# Patient Record
Sex: FEMALE | Race: WHITE | NOT HISPANIC OR LATINO | Employment: OTHER | URBAN - METROPOLITAN AREA
[De-identification: names, ages, dates, MRNs, and addresses within clinical notes are randomized per-mention and may not be internally consistent; named-entity substitution may affect disease eponyms.]

---

## 2017-01-01 ENCOUNTER — APPOINTMENT (OUTPATIENT)
Dept: MEDICAL GROUP | Facility: MEDICAL CENTER | Age: 82
End: 2017-01-01
Payer: MEDICARE

## 2017-01-01 ENCOUNTER — OFFICE VISIT (OUTPATIENT)
Dept: MEDICAL GROUP | Facility: MEDICAL CENTER | Age: 82
End: 2017-01-01
Payer: MEDICARE

## 2017-01-01 ENCOUNTER — OFFICE VISIT (OUTPATIENT)
Dept: BEHAVIORAL HEALTH | Facility: PHYSICIAN GROUP | Age: 82
End: 2017-01-01
Payer: MEDICARE

## 2017-01-01 ENCOUNTER — TELEPHONE (OUTPATIENT)
Dept: BEHAVIORAL HEALTH | Facility: PHYSICIAN GROUP | Age: 82
End: 2017-01-01

## 2017-01-01 VITALS
HEIGHT: 68 IN | BODY MASS INDEX: 37.59 KG/M2 | WEIGHT: 248 LBS | HEART RATE: 96 BPM | SYSTOLIC BLOOD PRESSURE: 148 MMHG | DIASTOLIC BLOOD PRESSURE: 82 MMHG

## 2017-01-01 VITALS
HEART RATE: 71 BPM | SYSTOLIC BLOOD PRESSURE: 120 MMHG | DIASTOLIC BLOOD PRESSURE: 70 MMHG | WEIGHT: 233.47 LBS | OXYGEN SATURATION: 90 % | HEIGHT: 68 IN | RESPIRATION RATE: 14 BRPM | TEMPERATURE: 97 F | BODY MASS INDEX: 35.38 KG/M2

## 2017-01-01 VITALS — SYSTOLIC BLOOD PRESSURE: 137 MMHG | HEIGHT: 68 IN | DIASTOLIC BLOOD PRESSURE: 89 MMHG | HEART RATE: 81 BPM

## 2017-01-01 VITALS
HEIGHT: 68 IN | OXYGEN SATURATION: 90 % | BODY MASS INDEX: 34.56 KG/M2 | HEART RATE: 95 BPM | RESPIRATION RATE: 16 BRPM | SYSTOLIC BLOOD PRESSURE: 120 MMHG | TEMPERATURE: 97 F | DIASTOLIC BLOOD PRESSURE: 82 MMHG | WEIGHT: 228 LBS

## 2017-01-01 DIAGNOSIS — I10 ESSENTIAL HYPERTENSION: ICD-10-CM

## 2017-01-01 DIAGNOSIS — F41.9 ANXIETY: ICD-10-CM

## 2017-01-01 DIAGNOSIS — J44.9 CHRONIC OBSTRUCTIVE PULMONARY DISEASE, UNSPECIFIED COPD TYPE (HCC): ICD-10-CM

## 2017-01-01 DIAGNOSIS — Z23 NEEDS FLU SHOT: ICD-10-CM

## 2017-01-01 DIAGNOSIS — F13.20 XANAX USE DISORDER, MODERATE, DEPENDENCE (HCC): ICD-10-CM

## 2017-01-01 DIAGNOSIS — R10.9 ABDOMINAL DISCOMFORT: ICD-10-CM

## 2017-01-01 DIAGNOSIS — F41.9 ANXIETY DISORDER, UNSPECIFIED TYPE: ICD-10-CM

## 2017-01-01 DIAGNOSIS — Z95.0 PACEMAKER: ICD-10-CM

## 2017-01-01 DIAGNOSIS — Z00.00 HEALTHCARE MAINTENANCE: ICD-10-CM

## 2017-01-01 DIAGNOSIS — N39.3 STRESS INCONTINENCE OF URINE: ICD-10-CM

## 2017-01-01 DIAGNOSIS — Z95.0 CARDIAC PACEMAKER: ICD-10-CM

## 2017-01-01 DIAGNOSIS — I48.0 PAROXYSMAL ATRIAL FIBRILLATION (HCC): ICD-10-CM

## 2017-01-01 DIAGNOSIS — F41.9 CHRONIC ANXIETY: ICD-10-CM

## 2017-01-01 PROCEDURE — 99214 OFFICE O/P EST MOD 30 MIN: CPT | Performed by: FAMILY MEDICINE

## 2017-01-01 PROCEDURE — G0008 ADMIN INFLUENZA VIRUS VAC: HCPCS | Performed by: FAMILY MEDICINE

## 2017-01-01 PROCEDURE — 99213 OFFICE O/P EST LOW 20 MIN: CPT | Performed by: PSYCHIATRY & NEUROLOGY

## 2017-01-01 PROCEDURE — 90662 IIV NO PRSV INCREASED AG IM: CPT | Performed by: FAMILY MEDICINE

## 2017-01-01 PROCEDURE — 99214 OFFICE O/P EST MOD 30 MIN: CPT | Mod: 25 | Performed by: FAMILY MEDICINE

## 2017-01-01 PROCEDURE — 99214 OFFICE O/P EST MOD 30 MIN: CPT | Performed by: PSYCHIATRY & NEUROLOGY

## 2017-01-01 RX ORDER — ALPRAZOLAM 0.5 MG/1
0.5 TABLET ORAL 3 TIMES DAILY PRN
Qty: 90 TAB | Refills: 2 | Status: SHIPPED
Start: 2017-01-01 | End: 2018-01-01 | Stop reason: SDUPTHER

## 2017-01-01 RX ORDER — ALPRAZOLAM 0.5 MG/1
0.5 TABLET ORAL 3 TIMES DAILY PRN
Qty: 90 TAB | Refills: 2 | OUTPATIENT
Start: 2017-01-01

## 2017-01-01 RX ORDER — ALPRAZOLAM 0.5 MG/1
0.5 TABLET ORAL 3 TIMES DAILY PRN
Qty: 90 TAB | Refills: 2 | Status: SHIPPED
Start: 2017-01-01 | End: 2017-01-01 | Stop reason: SDUPTHER

## 2017-01-01 ASSESSMENT — PATIENT HEALTH QUESTIONNAIRE - PHQ9
CLINICAL INTERPRETATION OF PHQ2 SCORE: 6
5. POOR APPETITE OR OVEREATING: 0 - NOT AT ALL
SUM OF ALL RESPONSES TO PHQ QUESTIONS 1-9: 16

## 2017-01-03 DIAGNOSIS — F41.1 ANXIETY STATE: ICD-10-CM

## 2017-01-03 RX ORDER — ALPRAZOLAM 0.5 MG/1
0.5 TABLET ORAL 4 TIMES DAILY
Qty: 120 TAB | Refills: 0 | Status: SHIPPED
Start: 2017-01-03 | End: 2017-01-04 | Stop reason: SDUPTHER

## 2017-01-03 NOTE — TELEPHONE ENCOUNTER
Was the patient seen in the last year in this department? Yes     Does patient have an active prescription for medications requested? No     Received Request Via: Patient

## 2017-01-04 DIAGNOSIS — F41.1 ANXIETY STATE: ICD-10-CM

## 2017-01-04 RX ORDER — ALPRAZOLAM 0.5 MG/1
0.5 TABLET ORAL 4 TIMES DAILY
Qty: 120 TAB | Refills: 0 | Status: SHIPPED
Start: 2017-01-04 | End: 2017-02-01 | Stop reason: SDUPTHER

## 2017-02-01 ENCOUNTER — TELEPHONE (OUTPATIENT)
Dept: MEDICAL GROUP | Facility: MEDICAL CENTER | Age: 82
End: 2017-02-01

## 2017-02-01 DIAGNOSIS — F41.1 ANXIETY STATE: ICD-10-CM

## 2017-02-01 RX ORDER — ALPRAZOLAM 0.5 MG/1
0.5 TABLET ORAL 4 TIMES DAILY
Qty: 120 TAB | Refills: 0 | Status: CANCELLED
Start: 2017-02-01

## 2017-02-01 RX ORDER — ALPRAZOLAM 0.5 MG/1
0.5 TABLET ORAL 4 TIMES DAILY
Qty: 120 TAB | Refills: 0 | Status: SHIPPED
Start: 2017-02-01 | End: 2017-02-13 | Stop reason: SDUPTHER

## 2017-02-01 NOTE — TELEPHONE ENCOUNTER
Was the patient seen in the last year in this department? Yes     Does patient have an active prescription for medications requested? No    Received Request Via: Pharmacy

## 2017-02-13 ENCOUNTER — HOSPITAL ENCOUNTER (OUTPATIENT)
Dept: LAB | Facility: MEDICAL CENTER | Age: 82
End: 2017-02-13
Attending: INTERNAL MEDICINE
Payer: MEDICARE

## 2017-02-13 ENCOUNTER — OFFICE VISIT (OUTPATIENT)
Dept: MEDICAL GROUP | Facility: MEDICAL CENTER | Age: 82
End: 2017-02-13
Payer: MEDICARE

## 2017-02-13 VITALS
HEIGHT: 68 IN | RESPIRATION RATE: 16 BRPM | OXYGEN SATURATION: 95 % | BODY MASS INDEX: 35.46 KG/M2 | DIASTOLIC BLOOD PRESSURE: 80 MMHG | WEIGHT: 234 LBS | SYSTOLIC BLOOD PRESSURE: 148 MMHG | HEART RATE: 77 BPM | TEMPERATURE: 97 F

## 2017-02-13 DIAGNOSIS — E55.9 VITAMIN D DEFICIENCY: ICD-10-CM

## 2017-02-13 DIAGNOSIS — K59.01 SLOW TRANSIT CONSTIPATION: ICD-10-CM

## 2017-02-13 DIAGNOSIS — F41.1 ANXIETY STATE: ICD-10-CM

## 2017-02-13 DIAGNOSIS — F41.9 ANXIETY: ICD-10-CM

## 2017-02-13 DIAGNOSIS — E53.8 B12 DEFICIENCY: ICD-10-CM

## 2017-02-13 DIAGNOSIS — Z13.220 SCREENING, LIPID: ICD-10-CM

## 2017-02-13 DIAGNOSIS — F13.20 BENZODIAZEPINE DEPENDENCE (HCC): ICD-10-CM

## 2017-02-13 LAB
25(OH)D3 SERPL-MCNC: 32 NG/ML (ref 30–100)
ALBUMIN SERPL BCP-MCNC: 4.1 G/DL (ref 3.2–4.9)
ALBUMIN/GLOB SERPL: 1.1 G/DL
ALP SERPL-CCNC: 111 U/L (ref 30–99)
ALT SERPL-CCNC: 11 U/L (ref 2–50)
ANION GAP SERPL CALC-SCNC: 8 MMOL/L (ref 0–11.9)
ANION GAP SERPL CALC-SCNC: 8 MMOL/L (ref 0–11.9)
AST SERPL-CCNC: 14 U/L (ref 12–45)
BASOPHILS # BLD AUTO: 0.02 K/UL (ref 0–0.12)
BASOPHILS NFR BLD AUTO: 0.3 % (ref 0–1.8)
BILIRUB SERPL-MCNC: 0.5 MG/DL (ref 0.1–1.5)
BUN SERPL-MCNC: 15 MG/DL (ref 8–22)
BUN SERPL-MCNC: 15 MG/DL (ref 8–22)
CALCIUM SERPL-MCNC: 10.1 MG/DL (ref 8.5–10.5)
CALCIUM SERPL-MCNC: 10.1 MG/DL (ref 8.5–10.5)
CHLORIDE SERPL-SCNC: 98 MMOL/L (ref 96–112)
CHLORIDE SERPL-SCNC: 98 MMOL/L (ref 96–112)
CHOLEST SERPL-MCNC: 270 MG/DL (ref 100–199)
CO2 SERPL-SCNC: 31 MMOL/L (ref 20–33)
CO2 SERPL-SCNC: 31 MMOL/L (ref 20–33)
CREAT SERPL-MCNC: 0.73 MG/DL (ref 0.5–1.4)
CREAT SERPL-MCNC: 0.74 MG/DL (ref 0.5–1.4)
EOSINOPHIL # BLD: 0.1 K/UL (ref 0–0.51)
EOSINOPHIL NFR BLD AUTO: 1.6 % (ref 0–6.9)
ERYTHROCYTE [DISTWIDTH] IN BLOOD BY AUTOMATED COUNT: 47.9 FL (ref 35.9–50)
FOLATE SERPL-MCNC: 15.2 NG/ML
GLOBULIN SER CALC-MCNC: 3.9 G/DL (ref 1.9–3.5)
GLUCOSE SERPL-MCNC: 105 MG/DL (ref 65–99)
GLUCOSE SERPL-MCNC: 105 MG/DL (ref 65–99)
HCT VFR BLD AUTO: 41.8 % (ref 37–47)
HDLC SERPL-MCNC: 68 MG/DL
HGB BLD-MCNC: 12.9 G/DL (ref 12–16)
IMM GRANULOCYTES # BLD AUTO: 0.01 K/UL (ref 0–0.11)
IMM GRANULOCYTES NFR BLD AUTO: 0.2 % (ref 0–0.9)
LDLC SERPL CALC-MCNC: 173 MG/DL
LYMPHOCYTES # BLD: 1.3 K/UL (ref 1–4.8)
LYMPHOCYTES NFR BLD AUTO: 20.2 % (ref 22–41)
MCH RBC QN AUTO: 29.3 PG (ref 27–33)
MCHC RBC AUTO-ENTMCNC: 30.9 G/DL (ref 33.6–35)
MCV RBC AUTO: 95 FL (ref 81.4–97.8)
MONOCYTES # BLD: 0.45 K/UL (ref 0–0.85)
MONOCYTES NFR BLD AUTO: 7 % (ref 0–13.4)
NEUTROPHILS # BLD: 4.55 K/UL (ref 2–7.15)
NEUTROPHILS NFR BLD AUTO: 70.7 % (ref 44–72)
NRBC # BLD AUTO: 0 K/UL
NRBC BLD-RTO: 0 /100 WBC
PLATELET # BLD AUTO: 223 K/UL (ref 164–446)
PMV BLD AUTO: 10.7 FL (ref 9–12.9)
POTASSIUM SERPL-SCNC: 3.8 MMOL/L (ref 3.6–5.5)
POTASSIUM SERPL-SCNC: 3.9 MMOL/L (ref 3.6–5.5)
PROT SERPL-MCNC: 8 G/DL (ref 6–8.2)
RBC # BLD AUTO: 4.4 M/UL (ref 4.2–5.4)
SODIUM SERPL-SCNC: 137 MMOL/L (ref 135–145)
SODIUM SERPL-SCNC: 137 MMOL/L (ref 135–145)
TRIGL SERPL-MCNC: 144 MG/DL (ref 0–149)
VIT B12 SERPL-MCNC: 269 PG/ML (ref 211–911)
WBC # BLD AUTO: 6.4 K/UL (ref 4.8–10.8)

## 2017-02-13 PROCEDURE — 99214 OFFICE O/P EST MOD 30 MIN: CPT | Performed by: INTERNAL MEDICINE

## 2017-02-13 PROCEDURE — G8599 NO ASA/ANTIPLAT THER USE RNG: HCPCS | Performed by: INTERNAL MEDICINE

## 2017-02-13 PROCEDURE — 4040F PNEUMOC VAC/ADMIN/RCVD: CPT | Mod: 8P | Performed by: INTERNAL MEDICINE

## 2017-02-13 PROCEDURE — G8432 DEP SCR NOT DOC, RNG: HCPCS | Performed by: INTERNAL MEDICINE

## 2017-02-13 PROCEDURE — G8484 FLU IMMUNIZE NO ADMIN: HCPCS | Performed by: INTERNAL MEDICINE

## 2017-02-13 PROCEDURE — G8419 CALC BMI OUT NRM PARAM NOF/U: HCPCS | Performed by: INTERNAL MEDICINE

## 2017-02-13 PROCEDURE — 1036F TOBACCO NON-USER: CPT | Performed by: INTERNAL MEDICINE

## 2017-02-13 PROCEDURE — 82306 VITAMIN D 25 HYDROXY: CPT

## 2017-02-13 PROCEDURE — 1101F PT FALLS ASSESS-DOCD LE1/YR: CPT | Mod: 8P | Performed by: INTERNAL MEDICINE

## 2017-02-13 PROCEDURE — 82607 VITAMIN B-12: CPT

## 2017-02-13 PROCEDURE — 80053 COMPREHEN METABOLIC PANEL: CPT

## 2017-02-13 PROCEDURE — 82746 ASSAY OF FOLIC ACID SERUM: CPT

## 2017-02-13 PROCEDURE — 85025 COMPLETE CBC W/AUTO DIFF WBC: CPT

## 2017-02-13 RX ORDER — LUBIPROSTONE 8 UG/1
8 CAPSULE ORAL 2 TIMES DAILY
Qty: 60 CAP | Refills: 6
Start: 2017-02-13 | End: 2018-01-01

## 2017-02-13 RX ORDER — ALPRAZOLAM 0.5 MG/1
0.5 TABLET ORAL 4 TIMES DAILY
Qty: 120 TAB | Refills: 0 | Status: SHIPPED
Start: 2017-02-13 | End: 2017-03-22 | Stop reason: SDUPTHER

## 2017-02-13 NOTE — MR AVS SNAPSHOT
"        Emily Hopson   2017 3:00 PM   Office Visit   MRN: [id number]    Department:  [address] [place] [address]   Dept Phone:  [phone]    Description:  Female : 1935   Provider:  Lalit Arellano M.D.           Reason for Visit     Follow-Up     GI Problem           Allergies as of 2017     Allergen Noted Reactions    Aspirin 10/23/2014       Cannot take due to bleeding ulcer in the 70's.    Butorphanol Tartrate [Kdc:Benzalkonium Chloride+Butorphanol] 10/23/2014       Pt denies allergy.    [place] 10/30/2015       heartburn    Codeine 10/23/2014   Rash, Vomiting    Pt sates rash and vomiting.    Dilaudid [Hydromorphone Hcl] 2017       Heparin 10/23/2014       Per historical: thrombocytopenia.    Ketorolac Tromethamine 10/23/2014       Pt denies allergy.  Per historical: N/V.    Meperidine 10/23/2014   Rash, Vomiting    Okay with Promethazine.    Milk Products Food Allergy 10/30/2015       Sensitivity to it. Causes bloating.     Morphine 10/23/2014   Rash, Vomiting    Novocain 10/23/2014       With epinephrine, causes patient to go into A-Fib.    Other Drug 2011       States allergic to most local anesthetics with epi    Oxycodone 2017       Sulfa Drugs 10/23/2014       Pt unsure of reaction.    Talwin 10/23/2014       \"Makes me go bezerk.\"    Tape 2009   Rash    Pt states rash.    Warfarin Sodium 10/23/2014       Due to also taking Nexium, pt's INR was 21.      You were diagnosed with     Slow transit constipation   [564.01.ICD-9-CM]       Anxiety   [081410]       Screening, lipid   [187410]       B12 deficiency   [238189]       Vitamin D deficiency   [3988880]       Benzodiazepine dependence (CMS-HCC)   [887481]       Anxiety state   [943103]         Vital Signs     Blood Pressure Pulse Temperature Respirations Height Weight    148/80 mmHg 77 36.1 °C (97 °F) 16 1.727 m (5' 8\") 106.142 kg (234 lb)    Body Mass Index Oxygen Saturation Smoking Status             35.59 " kg/m2 95% Former Smoker         Basic Information     Date Of Birth Sex Race Ethnicity Preferred Language    1935 Female White Non- English      Your appointments     Mar 06, 2017  3:40 PM   Established Patient with Lalit Arellano M.D.   [facility] [address]    [address]  [address]  [place] [address]   [phone]           You will be receiving a confirmation call a few days before your appointment from our automated call confirmation system.              Problem List              ICD-10-CM Priority Class Noted - Resolved    Sleep apnea G47.30 Medium  12/25/2013 - Present    Anemia D64.9 Medium  12/31/2013 - Present    Paroxysmal atrial fibrillation (CMS-HCC) I48.0 Medium  12/31/2013 - Present    Anxiety F41.9 High  12/31/2013 - Present    Normocytic anemia D64.9 Medium  1/18/2014 - Present    Obesity E66.9 Low  2/16/2014 - Present    Chronic pain G89.29 Medium  2/16/2014 - Present    H/O vitamin D deficiency Z86.39 Low  2/16/2014 - Present    Chronic abdominal pain R10.9, G89.29 Medium  2/16/2014 - Present    CAD (coronary artery disease), native coronary artery I25.10 High  2/16/2014 - Present    History of colonic polyps Z86.010 Low  7/1/2014 - Present    Chest pain R07.9 Medium  10/20/2014 - Present    HTN (hypertension) I10 Medium  10/24/2014 - Present    Fatigue R53.83 Medium  3/10/2015 - Present    Nausea R11.0 Low  3/10/2015 - Present    Constipation K59.00 Medium  5/11/2015 - Present    Colitis K52.9 High  5/27/2015 - Present    Depressive disorder, not elsewhere classified F32.9 Medium  8/4/2015 - Present    Impaired fasting glucose R73.01 Medium  8/5/2015 - Present    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 High  9/29/2015 - Present    Leukopenia D72.819 Medium  11/3/2015 - Present    COPD (chronic obstructive pulmonary disease) (CMS-HCC) J44.9 High  11/3/2015 - Present    Low vitamin B12 level E53.8 Low  12/10/2015 - Present    Dermatophytosis, hand B35.2 Low   12/10/2015 - Present    Multiple lung nodules on CT R91.8   8/4/2016 - Present    Lung metastases (CMS-HCC) C78.00   8/4/2016 - Present      Health Maintenance        Date Due Completion Dates    IMM DTaP/Tdap/Td Vaccine (1 - Tdap) 5/10/1956 ---    IMM ZOSTER VACCINE 5/10/1997 ---    IMM PNEUMOCOCCAL 65+ (ADULT) LOW/MEDIUM RISK SERIES (1 of 2 - PCV13) 5/10/2002 ---    IMM INFLUENZA (1) 9/1/2016 10/20/2014, 10/1/2013    COLONOSCOPY 7/23/2024 7/23/2014, 7/10/2014 (Prv Comp)    Override on 7/10/2014: Previously completed            Current Immunizations     Influenza TIV (IM) 10/1/2013    Influenza Vaccine Adult HD 10/20/2014  8:48 AM    Pneumococcal Vaccine (UF)Historical Data 10/1/2013      Below and/or attached are the medications your provider expects you to take. Review all of your home medications and newly ordered medications with your provider and/or pharmacist. Follow medication instructions as directed by your provider and/or pharmacist. Please keep your medication list with you and share with your provider. Update the information when medications are discontinued, doses are changed, or new medications (including over-the-counter products) are added; and carry medication information at all times in the event of emergency situations     Allergies:  ASPIRIN - (reactions not documented)     BUTORPHANOL TARTRATE - (reactions not documented)     CITRUS - (reactions not documented)     CODEINE - Rash,Vomiting     DILAUDID - (reactions not documented)     HEPARIN - (reactions not documented)     KETOROLAC TROMETHAMINE - (reactions not documented)     MEPERIDINE - Rash,Vomiting     MILK PRODUCTS FOOD ALLERGY - (reactions not documented)     MORPHINE - Rash,Vomiting     NOVOCAIN - (reactions not documented)     OTHER DRUG - (reactions not documented)     OXYCODONE - (reactions not documented)     SULFA DRUGS - (reactions not documented)     TALWIN - (reactions not documented)     TAPE - Rash     WARFARIN SODIUM -  (reactions not documented)               Medications  Valid as of: February 13, 2017 -  4:25 PM    Generic Name Brand Name Tablet Size Instructions for use    ALPRAZolam (Tab) XANAX 0.5 MG Take 1 Tab by mouth 4 times a day.        CloNIDine HCl (Tab) CATAPRES 0.1 MG Take 0.1 mg by mouth 4 times a day as needed. Every 4 hours as needed only if systolic is above 170 or diastolic 99 either   Indications: High Blood Pressure        Clotrimazole-Betamethasone (Cream) LOTRISONE 1-0.05 % Apply 1 Application to affected area(s) 2 times a day.        Cyanocobalamin (Tab) vitamin b12 500 MCG Take 1 Tab by mouth every day.        DiltiaZEM HCl Coated Beads (CAPSULE SR 24 HR) CARDIZEM  MG Take 1 Cap by mouth every day. Takes 120 mg  mg PM fill both        DiltiaZEM HCl Coated Beads (CAPSULE SR 24 HR) CARDIZEM  MG Take 1 Cap by mouth every evening.        Esomeprazole Magnesium (CAPSULE DELAYED RELEASE) NEXIUM 40 MG Take 1 Cap by mouth every day.        Levalbuterol Tartrate (Aerosol) XOPENEX HFA 45 MCG/ACT Inhale 2 Puffs by mouth every four hours as needed for Shortness of Breath.        Lubiprostone (Cap) Lubiprostone 8 MCG Take 1 Cap by mouth 2 Times a Day.        Magnesium Hydroxide (Suspension) MILK OF MAGNESIA 400 MG/5ML Take 30 mL by mouth 1 time daily as needed (Constipation).        Nitroglycerin (SL Tab) NITROSTAT 0.4 MG Place 0.4 mg under tongue every 5 minutes as needed for Chest Pain.        Ondansetron (TABLET DISPERSIBLE) ZOFRAN ODT 4 MG Take 1 Tab by mouth 4 times a day as needed for Nausea/Vomiting (for N/V).        .                 Medicines prescribed today were sent to:     [id number] [place] - [address]    [address] [place] [address]    Phone: [phone] Fax: [phone]    Open 24 Hours?: No    [id number] - [place] - [address]    [address] [place] [address]    Phone: [phone] Fax: [phone]    Open 24 Hours?: No        Medication refill instructions:       If your prescription bottle indicates you have medication refills left, it is not necessary to call your provider’s office. Please contact your pharmacy and they will refill your medication.    If your prescription bottle indicates you do not have any refills left, you may request refills at any time through one of the following ways: The online [employer] system (except Urgent Care), by calling your provider’s office, or by asking your pharmacy to contact your provider’s office with a refill request. Medication refills are processed only during regular business hours and may not be available until the next business day. Your provider may request additional information or to have a follow-up visit with you prior to refilling your medication.   *Please Note: Medication refills are assigned a new Rx number when refilled electronically. Your pharmacy may indicate that no refills were authorized even though a new prescription for the same medication is available at the pharmacy. Please request the medicine by name with the pharmacy before contacting your provider for a refill.        Your To Do List     Future Labs/Procedures Complete By Expires    CBC WITH DIFFERENTIAL  As directed 2/14/2018    COMP METABOLIC PANEL  As directed 2/14/2018    FOLATE  As directed 2/14/2018    LIPID PROFILE  As directed 2/14/2018    VITAMIN B12  As directed 2/14/2018    VITAMIN D,25 HYDROXY  As directed 2/14/2018      Referral     A referral request has been sent to our patient care coordination department. Please allow 3-5 business days for us to process this request and contact you either by phone or mail. If you do not hear from us by the 5th business day, please call us at [phone].           [employer] Access Code: Activation code not generated  Current [employer] Status: Active

## 2017-02-14 NOTE — PROGRESS NOTES
CC: Follow-up multiple issues    HPI:   Emily presents today with the following.    1. Anxiety  Presents having been on Xanax for the past 25 years at 0.5 mg 4 times a day. She states repeatedly she was started on medication for coronary vasospasm. She was met for the first time 3 months ago and given refills of prescription while she got into her specialist. She comes in today requesting refills. She declines for anxiety but does have seizures when not on the medication.    2. Benzodiazepine dependence (CMS-HCC)  Discussion today whether she feels she is anxious or not she certainly has a dependency. Cardiology now refusing to write for medications. Do believe that she cannot be taken off them abruptly otherwise there is fear of seizures.    3. Slow transit constipation  She was seen by GI for what sounded like mesenteric ischemia however during her visit she was placed on nondominant T Pearson likely IBS. She is still having bloating.    4. B12 deficiency   History of B-12 deficiency in the past normal as last check.      5. Vitamin D deficiency  Also history of vitamin D deficiency overdue for recheck.          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/2015     Priority: High   • Colitis 05/27/2015     Priority: High   • CAD (coronary artery disease), native coronary artery 02/16/2014     Priority: High   • Anxiety 12/31/2013     Priority: High   • Leukopenia 11/03/2015     Priority: Medium   • Impaired fasting glucose 08/05/2015     Priority: Medium   • Depressive disorder, not elsewhere classified 08/04/2015     Priority: Medium   • Constipation 05/11/2015     Priority: Medium   • Fatigue 03/10/2015     Priority: Medium   • HTN (hypertension) 10/24/2014     Priority: Medium   • Chest pain 10/20/2014     Priority: Medium   • Chronic pain 02/16/2014     Priority: Medium   •  Chronic abdominal pain 02/16/2014     Priority: Medium   • Normocytic anemia 01/18/2014     Priority: Medium   • Anemia 12/31/2013     Priority: Medium   • Paroxysmal atrial fibrillation (CMS-HCC) 12/31/2013     Priority: Medium   • Sleep apnea 12/25/2013     Priority: Medium   • Low vitamin B12 level 12/10/2015     Priority: Low   • Dermatophytosis, hand 12/10/2015     Priority: Low   • Nausea 03/10/2015     Priority: Low   • History of colonic polyps 07/01/2014     Priority: Low   • Obesity 02/16/2014     Priority: Low   • H/O vitamin D deficiency 02/16/2014     Priority: Low   • Benzodiazepine dependence (CMS-[facility]) 02/13/2017   • Multiple lung nodules on CT 08/04/2016   • Lung metastases (CMS-[facility]) 08/04/2016       Current Outpatient Prescriptions   Medication Sig Dispense Refill   • Lubiprostone (AMITIZA) 8 MCG Cap Take 1 Cap by mouth 2 Times a Day. 60 Cap 6   • alprazolam (XANAX) 0.5 MG Tab Take 1 Tab by mouth 4 times a day. 120 Tab 0   • diltiazem CD (CARDIZEM CD) 120 MG CAPSULE SR 24 HR Take 1 Cap by mouth every day. Takes 120 mg  mg PM fill both 90 Cap 3   • diltiazem CD (CARDIZEM CD) 240 MG CAPSULE SR 24 HR Take 1 Cap by mouth every evening. 90 Cap 3   • ondansetron (ZOFRAN ODT) 4 MG TABLET DISPERSIBLE Take 1 Tab by mouth 4 times a day as needed for Nausea/Vomiting (for N/V). 60 Tab 5   • clotrimazole-betamethasone (LOTRISONE) 1-0.05 % Cream Apply 1 Application to affected area(s) 2 times a day. 45 g 3   • cyanocobalamin (VITAMIN B12) 500 MCG tablet Take 1 Tab by mouth every day. 30 Tab 1   • nitroglycerin (NITROSTAT) 0.4 MG SL Tab Place 0.4 mg under tongue every 5 minutes as needed for Chest Pain.     • clonidine (CATAPRES) 0.1 MG Tab Take 0.1 mg by mouth 4 times a day as needed. Every 4 hours as needed only if systolic is above 170 or diastolic 99 either   Indications: High Blood Pressure     • levalbuterol (XOPENEX HFA) 45 MCG/ACT inhaler Inhale 2 Puffs by mouth every four hours as needed for  "Shortness of Breath. 1 Inhaler 11   • esomeprazole (NEXIUM) 40 MG delayed-release capsule Take 1 Cap by mouth every day. 90 Cap 3   • magnesium hydroxide (MILK OF MAGNESIA) 400 MG/5ML SUSP Take 30 mL by mouth 1 time daily as needed (Constipation).       No current facility-administered medications for this visit.         Allergies as of 02/13/2017 - Wei as Reviewed 02/13/2017   Allergen Reaction Noted   • Aspirin  10/23/2014   • Butorphanol tartrate [kdc:benzalkonium chloride+butorphanol]  10/23/2014   • [place]  10/30/2015   • Codeine Rash and Vomiting 10/23/2014   • Dilaudid [hydromorphone hcl]  02/13/2017   • Heparin  10/23/2014   • Ketorolac tromethamine  10/23/2014   • Meperidine Rash and Vomiting 10/23/2014   • Milk products food allergy  10/30/2015   • Morphine Rash and Vomiting 10/23/2014   • Novocain  10/23/2014   • Other drug  03/21/2011   • Oxycodone  02/13/2017   • Sulfa drugs  10/23/2014   • Talwin  10/23/2014   • Tape Rash 11/03/2009   • Warfarin sodium  10/23/2014        ROS: As per HPI.    /80 mmHg  Pulse 77  Temp(Src) 36.1 °C (97 °F)  Resp 16  Ht 1.727 m (5' 8\")  Wt 106.142 kg (234 lb)  BMI 35.59 kg/m2  SpO2 95%    Physical Exam:  Gen:         Alert and oriented, No apparent distress.  Neck:        No Lymphadenopathy or Bruits.  Lungs:     Clear to auscultation bilaterally  CV:          Regular rate and rhythm. No murmurs, rubs or gallops.               Ext:          No clubbing, cyanosis, edema.      Assessment and Plan.   81 y.o. female with the following issues.    1. Anxiety  Discussion today about medication she does report she was intubated when given pain medications while in the hospital because of respiratory suppression. Checked her that I'm not comfortable writing for those doses of medication given her respiratory status. Gave her the option of referral to psychiatry versus neurology and she chose psychiatry.  - REFERRAL TO PSYCHIATRY  - alprazolam (XANAX) 0.5 MG Tab; Take 1 " Tab by mouth 4 times a day.  Dispense: 120 Tab; Refill: 0    2. Benzodiazepine dependence (CMS-HCC)  Again she does have dependence have given one prescription for next month with ample time to at least schedule appointment for psychiatrist prior to next visit. Also gave her the option if she cannot get her cardiologist rates the medications to establish with a new primary care physician that may be more willing to write for her doses of benzos. Discussion about the risk of respiratory suppression especially having been on oxygen for some time.  - REFERRAL TO PSYCHIATRY    3. Slow transit constipation  No further recommendations by gastroenterology continue hematochezia.    4. B12 deficiency  We have sent for blood levels.  - VITAMIN B12; Future  - FOLATE; Future  - CBC WITH DIFFERENTIAL; Future    5. Vitamin D deficiency  Again sending for blood work.  - VITAMIN D,25 HYDROXY; Future

## 2017-03-02 ENCOUNTER — TELEPHONE (OUTPATIENT)
Dept: MEDICAL GROUP | Facility: MEDICAL CENTER | Age: 82
End: 2017-03-02

## 2017-03-02 DIAGNOSIS — F32.89 DEPRESSIVE DISORDER, NOT ELSEWHERE CLASSIFIED: ICD-10-CM

## 2017-03-02 DIAGNOSIS — F41.9 ANXIETY: ICD-10-CM

## 2017-03-02 NOTE — TELEPHONE ENCOUNTER
1. Caller Name: Emily Hopson                                         Call Back Number: [phone] (home)       Patient approves a detailed voicemail message: yes    Pt called wanting a referral to Behavioral health please

## 2017-03-06 ENCOUNTER — APPOINTMENT (OUTPATIENT)
Dept: MEDICAL GROUP | Facility: MEDICAL CENTER | Age: 82
End: 2017-03-06
Payer: MEDICARE

## 2017-03-21 ENCOUNTER — APPOINTMENT (OUTPATIENT)
Dept: MEDICAL GROUP | Facility: MEDICAL CENTER | Age: 82
End: 2017-03-21
Payer: MEDICARE

## 2017-03-22 ENCOUNTER — OFFICE VISIT (OUTPATIENT)
Dept: MEDICAL GROUP | Facility: MEDICAL CENTER | Age: 82
End: 2017-03-22
Payer: MEDICARE

## 2017-03-22 VITALS
HEART RATE: 87 BPM | OXYGEN SATURATION: 91 % | SYSTOLIC BLOOD PRESSURE: 137 MMHG | TEMPERATURE: 98.1 F | HEIGHT: 68 IN | WEIGHT: 235 LBS | DIASTOLIC BLOOD PRESSURE: 80 MMHG | BODY MASS INDEX: 35.61 KG/M2 | RESPIRATION RATE: 14 BRPM

## 2017-03-22 DIAGNOSIS — J44.9 CHRONIC OBSTRUCTIVE PULMONARY DISEASE, UNSPECIFIED COPD TYPE (HCC): ICD-10-CM

## 2017-03-22 DIAGNOSIS — F41.9 ANXIETY: ICD-10-CM

## 2017-03-22 DIAGNOSIS — Z00.00 HEALTH CARE MAINTENANCE: ICD-10-CM

## 2017-03-22 DIAGNOSIS — F13.20 BENZODIAZEPINE DEPENDENCE (HCC): ICD-10-CM

## 2017-03-22 DIAGNOSIS — I10 ESSENTIAL HYPERTENSION: ICD-10-CM

## 2017-03-22 DIAGNOSIS — I48.0 PAROXYSMAL ATRIAL FIBRILLATION (HCC): ICD-10-CM

## 2017-03-22 DIAGNOSIS — Z95.0 CARDIAC PACEMAKER: ICD-10-CM

## 2017-03-22 PROCEDURE — 1036F TOBACCO NON-USER: CPT | Performed by: FAMILY MEDICINE

## 2017-03-22 PROCEDURE — G8484 FLU IMMUNIZE NO ADMIN: HCPCS | Performed by: FAMILY MEDICINE

## 2017-03-22 PROCEDURE — G8432 DEP SCR NOT DOC, RNG: HCPCS | Performed by: FAMILY MEDICINE

## 2017-03-22 PROCEDURE — G8419 CALC BMI OUT NRM PARAM NOF/U: HCPCS | Performed by: FAMILY MEDICINE

## 2017-03-22 PROCEDURE — 1101F PT FALLS ASSESS-DOCD LE1/YR: CPT | Performed by: FAMILY MEDICINE

## 2017-03-22 PROCEDURE — 99214 OFFICE O/P EST MOD 30 MIN: CPT | Performed by: FAMILY MEDICINE

## 2017-03-22 PROCEDURE — 4040F PNEUMOC VAC/ADMIN/RCVD: CPT | Mod: 8P | Performed by: FAMILY MEDICINE

## 2017-03-22 PROCEDURE — G8598 ASA/ANTIPLAT THER USED: HCPCS | Performed by: FAMILY MEDICINE

## 2017-03-22 RX ORDER — ALPRAZOLAM 0.5 MG/1
0.5 TABLET ORAL 4 TIMES DAILY
Qty: 44 TAB | Refills: 0 | Status: SHIPPED | OUTPATIENT
Start: 2017-03-22 | End: 2017-04-10

## 2017-03-22 NOTE — PROGRESS NOTES
CC: Establish a new PCP    HPI:  Emily presents today to establish a new primary care relationship. Have seen Dr Arellano recently.    The following chronic medical issues were discussed:    Chronic obstructive pulmonary disease, she has been stable,has been on oxygen 2L/min without a problem.Takes on Xopenex inhaler as needed.    Chronic atrial fibrillation, she has been stable , and asymptomatic, denies palpitation..has been no Cardizem.She follows up with cardiology Dr Marcum ( asked patient to get all records form card).She also has a cardiac pacemaker , as per patient was inserted because her heart beats goes down ( possibly SSS), currently asymptomatic    Chronic anxiety, patient has been on high-dose benzodiazepine's for the last 25 years. She reports any efforts to titrate down have led to seizure . She has medications now( will last her till 3/30/17) but will run  out of meds before her appt with psychiatry on 4/11.Patient gave a h/o respiratory depression in the past ( was intubated in the ICU). Discuss risk of benzo in elderly patient stated that she understand that but she can not stop it. However advised to follow up with psychiatrist.    Essential hypertension, BP has been adequately controlled on current medication. Denies headache, chest pain, and SOB.has been on Cardizem for both BP control, and HR control.    Chronic constipation, has been taking laxative, sometimes it works other times not. Denies abdominal pain, nausea, and vomiting.Her BM fluctuate. Has h/o what she prescribed as ischemia ( possible mesentric ischemia.)    Will discuss HM next visit.         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/2015     Priority: High   • Colitis 05/27/2015     Priority: High   • CAD (coronary artery disease), native coronary artery 02/16/2014     Priority: High   •  Anxiety 12/31/2013     Priority: High   • Leukopenia 11/03/2015     Priority: Medium   • Impaired fasting glucose 08/05/2015     Priority: Medium   • Depressive disorder, not elsewhere classified 08/04/2015     Priority: Medium   • Constipation 05/11/2015     Priority: Medium   • Fatigue 03/10/2015     Priority: Medium   • HTN (hypertension) 10/24/2014     Priority: Medium   • Chest pain 10/20/2014     Priority: Medium   • Chronic pain 02/16/2014     Priority: Medium   • Chronic abdominal pain 02/16/2014     Priority: Medium   • Normocytic anemia 01/18/2014     Priority: Medium   • Anemia 12/31/2013     Priority: Medium   • Paroxysmal atrial fibrillation (CMS-HCC) 12/31/2013     Priority: Medium   • Sleep apnea 12/25/2013     Priority: Medium   • Low vitamin B12 level 12/10/2015     Priority: Low   • Dermatophytosis, hand 12/10/2015     Priority: Low   • Nausea 03/10/2015     Priority: Low   • History of colonic polyps 07/01/2014     Priority: Low   • Obesity 02/16/2014     Priority: Low   • H/O vitamin D deficiency 02/16/2014     Priority: Low   • Benzodiazepine dependence (CMS-HCC) 02/13/2017   • Multiple lung nodules on CT 08/04/2016   • Lung metastases (CMS-HCC) 08/04/2016       Current Outpatient Prescriptions   Medication Sig Dispense Refill   • aspirin EC (ECOTRIN) 81 MG Tablet Delayed Response Take 81 mg by mouth every day.     • alprazolam (XANAX) 0.5 MG Tab Take 1 Tab by mouth 4 times a day. 44 Tab 0   • Lubiprostone (AMITIZA) 8 MCG Cap Take 1 Cap by mouth 2 Times a Day. 60 Cap 6   • diltiazem CD (CARDIZEM CD) 120 MG CAPSULE SR 24 HR Take 1 Cap by mouth every day. Takes 120 mg  mg PM fill both 90 Cap 3   • diltiazem CD (CARDIZEM CD) 240 MG CAPSULE SR 24 HR Take 1 Cap by mouth every evening. 90 Cap 3   • ondansetron (ZOFRAN ODT) 4 MG TABLET DISPERSIBLE Take 1 Tab by mouth 4 times a day as needed for Nausea/Vomiting (for N/V). 60 Tab 5   • cyanocobalamin (VITAMIN B12) 500 MCG tablet Take 1 Tab by mouth  every day. 30 Tab 1   • nitroglycerin (NITROSTAT) 0.4 MG SL Tab Place 0.4 mg under tongue every 5 minutes as needed for Chest Pain.     • clonidine (CATAPRES) 0.1 MG Tab Take 0.1 mg by mouth 4 times a day as needed. Every 4 hours as needed only if systolic is above 170 or diastolic 99 either   Indications: High Blood Pressure     • levalbuterol (XOPENEX HFA) 45 MCG/ACT inhaler Inhale 2 Puffs by mouth every four hours as needed for Shortness of Breath. 1 Inhaler 11   • esomeprazole (NEXIUM) 40 MG delayed-release capsule Take 1 Cap by mouth every day. 90 Cap 3   • magnesium hydroxide (MILK OF MAGNESIA) 400 MG/5ML SUSP Take 30 mL by mouth 1 time daily as needed (Constipation).     • clotrimazole-betamethasone (LOTRISONE) 1-0.05 % Cream Apply 1 Application to affected area(s) 2 times a day. 45 g 3     No current facility-administered medications for this visit.         Allergies as of 03/22/2017 - Wei as Reviewed 03/22/2017   Allergen Reaction Noted   • Aspirin  10/23/2014   • Butorphanol tartrate [kdc:benzalkonium chloride+butorphanol]  10/23/2014   • Citrus  10/30/2015   • Codeine Rash and Vomiting 10/23/2014   • Dilaudid [hydromorphone hcl]  02/13/2017   • Heparin  10/23/2014   • Ketorolac tromethamine  10/23/2014   • Meperidine Rash and Vomiting 10/23/2014   • Milk products food allergy  10/30/2015   • Morphine Rash and Vomiting 10/23/2014   • Novocain  10/23/2014   • Other drug  03/21/2011   • Oxycodone  02/13/2017   • Sulfa drugs  10/23/2014   • Talwin  10/23/2014   • Tape Rash 11/03/2009   • Warfarin sodium  10/23/2014        Social History     Social History   • Marital Status:      Spouse Name: N/A   • Number of Children: N/A   • Years of Education: N/A     Occupational History   • Not on file.     Social History Main Topics   • Smoking status: Former Smoker -- 1.00 packs/day for 52 years     Types: Cigarettes     Start date: 01/17/1947     Quit date: 01/03/2014   • Smokeless tobacco: Never Used       "Comment: 1 pk a day for 40 yrs   • Alcohol Use: No   • Drug Use: No   • Sexual Activity: Not Currently     Other Topics Concern   • Not on file     Social History Narrative       Family History   Problem Relation Age of Onset   • Hypertension Mother        Past Surgical History   Procedure Laterality Date   • Blepharoplasty  6/16/2009     Performed by AJ SUAREZ at [facility]   • Blepharoplasty  11/3/2009     Performed by AJ SUAREZ at [facility]   • Tonsillectomy     • Hysterectomy, total abdominal     • Exploratory laparotomy     • Rib resection     • Other       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 at [facility]   • Colonoscopy  7/23/2014     Performed by Brett Estevez D.O. at [facility]       ROS:  Denies any Headache, Blurred Vision, Confusion Chest pain,  Shortness of breath,  Abdominal pain, Changes of bowel or bladder, Lower ext edema, Fevers, Nights sweats, Weight Changes, Focal weakness or numbness.  All other systems are negative.    /80 mmHg  Pulse 87  Temp(Src) 36.7 °C (98.1 °F)  Resp 14  Ht 1.727 m (5' 7.99\")  Wt 106.595 kg (235 lb)  BMI 35.74 kg/m2  SpO2 91%    Physical Exam:  Gen:         Alert and oriented, No apparent distress.Patient was seen in scooter.  HEENT:   Perrla, TM clear,  Oralpharynx no erythema or exudates.  Neck:       No Jugular venous distension, Lymphadenopathy, Thyromegaly, Bruits.  Lungs:     Clear to auscultation bilaterally  CV:          Irregularly " irregular rhythm. No murmurs, rubs or gallops.    Abd:         Soft non tender, non distended. Normal active bowel sounds. No hepatosplenomegaly, No pulsatile masses.  Ext:          No clubbing, cyanosis, edema.      Assessment and Plan.   81 y.o. female     1. Chronic obstructive pulmonary disease, unspecified COPD type (CMS-HCC)  Stable,on oxygen 2L/min  Has been on Xopenex inhaler as needed.    2. Chronic atrial fibrillation (CMS-HCC)  Stable .No RVR.  Continue no Cardizem.  Continue follow up with cardiology Dr Marcum ( asked patient to get all records form card).  Advised to discuss anticoagulant with cardio.(CHADs Vasc score is 2), has been on ASA.    3. Anxiety  chronic   Has been on high-dose benzodiazepine's for the last 25 years. She reports any efforts to titrate down have led to seizure . She has medications now( will last her till 3/30/17) but will run  out of meds before her appt with psychiatry on 4/11.  Will prescribe the medication until she sees the psychiatrist on 4/11.    - alprazolam (XANAX) 0.5 MG Tab; Take 1 Tab by mouth 4 times a day.  Dispense: 44 Tab; Refill: 0    4. Essential hypertension  Has been adequately controlled on current medication. Denies headache, chest pain, and SOB.  Continue on Cardizem.    5. Benzodiazepine dependence (CMS-HCC)  Has been on high dose of Xanax for more than 25 years. Patient has appointment with psychiatrist on 4/11.    6. Cardiac pacemaker  Probably for SSS. Stable, and asymptomatic  Continue follow up with cardiology Dr Marcum.    7. Chronic constipation  ? H/o mesentric ischemia.  Continue on combination of ( stimulant, and stool softener).  Hydration.  change food habit ( more vegetables and fruits).    8. Health care maintenance  Will discuss HM next visit.         Time spent 45 min,face to face encounter,more than 50% of which was spent on counseling the patient.

## 2017-03-22 NOTE — MR AVS SNAPSHOT
"        Emily Hopson   3/22/2017 4:00 PM   Office Visit   MRN: [id number]    Department:  [address] [place] [address]   Dept Phone:  [phone]    Description:  Female : 1935   Provider:  Maggy Reaves M.D.           Reason for Visit     Establish Care           Allergies as of 3/22/2017     Allergen Noted Reactions    Aspirin 10/23/2014       Cannot take due to bleeding ulcer in the 's.    Butorphanol Tartrate [Kdc:Benzalkonium Chloride+Butorphanol] 10/23/2014       Pt denies allergy.    H[place]ell 10/30/2015       heartburn    Codeine 10/23/2014   Rash, Vomiting    Pt sates rash and vomiting.    Dilaudid [Hydromorphone Hcl] 2017       Heparin 10/23/2014       Per historical: thrombocytopenia.    Ketorolac Tromethamine 10/23/2014       Pt denies allergy.  Per historical: N/V.    Meperidine 10/23/2014   Rash, Vomiting    Okay with Promethazine.    Milk Products Food Allergy 10/30/2015       Sensitivity to it. Causes bloating.     Morphine 10/23/2014   Rash, Vomiting    Novocain 10/23/2014       With epinephrine, causes patient to go into A-Fib.    Other Drug 2011       States allergic to most local anesthetics with epi    Oxycodone 2017       Sulfa Drugs 10/23/2014       Pt unsure of reaction.    Talwin 10/23/2014       \"Makes me go bezerk.\"    Tape 2009   Rash    Pt states rash.    Warfarin Sodium 10/23/2014       Due to also taking Nexium, pt's INR was 21.      You were diagnosed with     Chronic obstructive pulmonary disease, unspecified COPD type (CMS-[facility])   [6396641]       Paroxysmal atrial fibrillation (CMS-[facility])   [177912]       Anxiety   [992107]       Essential hypertension   [8635719]       Benzodiazepine dependence (CMS-[facility])   [731676]       Cardiac pacemaker   [579533]       Health care maintenance   [285583]         Vital Signs     Blood Pressure Pulse Temperature Respirations Height Weight    137/80 mmHg 87 36.7 °C (98.1 °F) 14 1.727 m (5' 7.99\") 106.595 kg " (235 lb)    Body Mass Index Oxygen Saturation Smoking Status             35.74 kg/m2 91% Former Smoker         Basic Information     Date Of Birth Sex Race Ethnicity Preferred Language    1935 Female White Non- English      Your appointments     Apr 10, 2017 11:30 AM   Initial Psychiatric Eval with Mague Calvin M.D.   [facility] [address] ([facility])    [address] [facility]  [address]  [place] [address]   [phone]            Jun 22, 2017  3:20 PM   Established Patient with Maggy Reaves M.D.   [facility] [address]    [address]  [address]  [place] [address]   [phone]           You will be receiving a confirmation call a few days before your appointment from our automated call confirmation system.              Problem List              ICD-10-CM Priority Class Noted - Resolved    Sleep apnea G47.30 Medium  12/25/2013 - Present    Anemia D64.9 Medium  12/31/2013 - Present    Paroxysmal atrial fibrillation (CMS-HCC) I48.0 Medium  12/31/2013 - Present    Anxiety F41.9 High  12/31/2013 - Present    Normocytic anemia D64.9 Medium  1/18/2014 - Present    Obesity E66.9 Low  2/16/2014 - Present    Chronic pain G89.29 Medium  2/16/2014 - Present    H/O vitamin D deficiency Z86.39 Low  2/16/2014 - Present    Chronic abdominal pain R10.9, G89.29 Medium  2/16/2014 - Present    CAD (coronary artery disease), native coronary artery I25.10 High  2/16/2014 - Present    History of colonic polyps Z86.010 Low  7/1/2014 - Present    Chest pain R07.9 Medium  10/20/2014 - Present    HTN (hypertension) I10 Medium  10/24/2014 - Present    Fatigue R53.83 Medium  3/10/2015 - Present    Nausea R11.0 Low  3/10/2015 - Present    Constipation K59.00 Medium  5/11/2015 - Present    Colitis K52.9 High  5/27/2015 - Present    Depressive disorder, not elsewhere classified F32.9 Medium  8/4/2015 - Present    Impaired fasting glucose R73.01 Medium  8/5/2015 - Present    Agitated depression  (CMS-HCC) F32.2 High  9/29/2015 - Present    Leukopenia D72.819 Medium  11/3/2015 - Present    COPD (chronic obstructive pulmonary disease) (CMS-HCC) J44.9 High  11/3/2015 - Present    Low vitamin B12 level E53.8 Low  12/10/2015 - Present    Dermatophytosis, hand B35.2 Low  12/10/2015 - Present    Multiple lung nodules on CT R91.8   8/4/2016 - Present    Lung metastases (CMS-HCC) C78.00   8/4/2016 - Present    Benzodiazepine dependence (CMS-HCC) F13.20   2/13/2017 - Present      Health Maintenance        Date Due Completion Dates    IMM DTaP/Tdap/Td Vaccine (1 - Tdap) 5/10/1956 ---    IMM ZOSTER VACCINE 5/10/1997 ---    IMM PNEUMOCOCCAL 65+ (ADULT) LOW/MEDIUM RISK SERIES (1 of 2 - PCV13) 5/10/2002 ---    IMM INFLUENZA (1) 9/1/2016 10/20/2014, 10/1/2013    COLONOSCOPY 7/23/2024 7/23/2014, 7/10/2014 (Prv Comp)    Override on 7/10/2014: Previously completed            Current Immunizations     Influenza TIV (IM) 10/1/2013    Influenza Vaccine Adult HD 10/20/2014  8:48 AM    Pneumococcal Vaccine (UF)Historical Data 10/1/2013      Below and/or attached are the medications your provider expects you to take. Review all of your home medications and newly ordered medications with your provider and/or pharmacist. Follow medication instructions as directed by your provider and/or pharmacist. Please keep your medication list with you and share with your provider. Update the information when medications are discontinued, doses are changed, or new medications (including over-the-counter products) are added; and carry medication information at all times in the event of emergency situations     Allergies:  ASPIRIN - (reactions not documented)     BUTORPHANOL TARTRATE - (reactions not documented)     CITRUS - (reactions not documented)     CODEINE - Rash,Vomiting     DILAUDID - (reactions not documented)     HEPARIN - (reactions not documented)     KETOROLAC TROMETHAMINE - (reactions not documented)     MEPERIDINE - Rash,Vomiting      MILK PRODUCTS FOOD ALLERGY - (reactions not documented)     MORPHINE - Rash,Vomiting     NOVOCAIN - (reactions not documented)     OTHER DRUG - (reactions not documented)     OXYCODONE - (reactions not documented)     SULFA DRUGS - (reactions not documented)     TALWIN - (reactions not documented)     TAPE - Rash     WARFARIN SODIUM - (reactions not documented)               Medications  Valid as of: March 22, 2017 -  4:58 PM    Generic Name Brand Name Tablet Size Instructions for use    ALPRAZolam (Tab) XANAX 0.5 MG Take 1 Tab by mouth 4 times a day.        Aspirin (Tablet Delayed Response) ECOTRIN 81 MG Take 81 mg by mouth every day.        CloNIDine HCl (Tab) CATAPRES 0.1 MG Take 0.1 mg by mouth 4 times a day as needed. Every 4 hours as needed only if systolic is above 170 or diastolic 99 either   Indications: High Blood Pressure        Clotrimazole-Betamethasone (Cream) LOTRISONE 1-0.05 % Apply 1 Application to affected area(s) 2 times a day.        Cyanocobalamin (Tab) vitamin b12 500 MCG Take 1 Tab by mouth every day.        DiltiaZEM HCl Coated Beads (CAPSULE SR 24 HR) CARDIZEM  MG Take 1 Cap by mouth every day. Takes 120 mg  mg PM fill both        DiltiaZEM HCl Coated Beads (CAPSULE SR 24 HR) CARDIZEM  MG Take 1 Cap by mouth every evening.        Esomeprazole Magnesium (CAPSULE DELAYED RELEASE) NEXIUM 40 MG Take 1 Cap by mouth every day.        Levalbuterol Tartrate (Aerosol) XOPENEX HFA 45 MCG/ACT Inhale 2 Puffs by mouth every four hours as needed for Shortness of Breath.        Lubiprostone (Cap) Lubiprostone 8 MCG Take 1 Cap by mouth 2 Times a Day.        Magnesium Hydroxide (Suspension) MILK OF MAGNESIA 400 MG/5ML Take 30 mL by mouth 1 time daily as needed (Constipation).        Nitroglycerin (SL Tab) NITROSTAT 0.4 MG Place 0.4 mg under tongue every 5 minutes as needed for Chest Pain.        Ondansetron (TABLET DISPERSIBLE) ZOFRAN ODT 4 MG Take 1 Tab by mouth 4 times a day as needed  for Nausea/Vomiting (for N/V).        .                 Medicines prescribed today were sent to:     [id number] - [place], [place] - [address]    [address] [place] [address]    Phone: [phone] Fax: [phone]    Open 24 Hours?: No    [id number] - [place] - [address] [place] [address]    Phone: [phone] Fax: [phone]    Open 24 Hours?: No      Medication refill instructions:       If your prescription bottle indicates you have medication refills left, it is not necessary to call your provider’s office. Please contact your pharmacy and they will refill your medication.    If your prescription bottle indicates you do not have any refills left, you may request refills at any time through one of the following ways: The online Vint system (except Urgent Care), by calling your provider’s office, or by asking your pharmacy to contact your provider’s office with a refill request. Medication refills are processed only during regular business hours and may not be available until the next business day. Your provider may request additional information or to have a follow-up visit with you prior to refilling your medication.   *Please Note: Medication refills are assigned a new Rx number when refilled electronically. Your pharmacy may indicate that no refills were authorized even though a new prescription for the same medication is available at the pharmacy. Please request the medicine by name with the pharmacy before contacting your provider for a refill.           Vint Access Code: Activation code not generated  Current Vint Status: Active

## 2017-04-10 ENCOUNTER — OFFICE VISIT (OUTPATIENT)
Dept: BEHAVIORAL HEALTH | Facility: PHYSICIAN GROUP | Age: 82
End: 2017-04-10
Payer: MEDICARE

## 2017-04-10 VITALS
SYSTOLIC BLOOD PRESSURE: 188 MMHG | DIASTOLIC BLOOD PRESSURE: 100 MMHG | HEIGHT: 68 IN | WEIGHT: 245 LBS | BODY MASS INDEX: 37.13 KG/M2 | HEART RATE: 85 BPM

## 2017-04-10 DIAGNOSIS — F13.20 XANAX USE DISORDER, MODERATE, DEPENDENCE (HCC): Primary | ICD-10-CM

## 2017-04-10 PROCEDURE — 1101F PT FALLS ASSESS-DOCD LE1/YR: CPT | Performed by: PSYCHIATRY & NEUROLOGY

## 2017-04-10 PROCEDURE — 1036F TOBACCO NON-USER: CPT | Performed by: PSYCHIATRY & NEUROLOGY

## 2017-04-10 PROCEDURE — G8432 DEP SCR NOT DOC, RNG: HCPCS | Performed by: PSYCHIATRY & NEUROLOGY

## 2017-04-10 PROCEDURE — G8598 ASA/ANTIPLAT THER USED: HCPCS | Performed by: PSYCHIATRY & NEUROLOGY

## 2017-04-10 PROCEDURE — 99205 OFFICE O/P NEW HI 60 MIN: CPT | Performed by: PSYCHIATRY & NEUROLOGY

## 2017-04-10 PROCEDURE — G8419 CALC BMI OUT NRM PARAM NOF/U: HCPCS | Performed by: PSYCHIATRY & NEUROLOGY

## 2017-04-10 PROCEDURE — 4040F PNEUMOC VAC/ADMIN/RCVD: CPT | Mod: 8P | Performed by: PSYCHIATRY & NEUROLOGY

## 2017-04-10 RX ORDER — ALPRAZOLAM 0.25 MG/1
0.25 TABLET ORAL
Qty: 30 TAB | Refills: 0 | Status: SHIPPED
Start: 2017-04-10 | End: 2017-05-09

## 2017-04-10 RX ORDER — ALPRAZOLAM 0.5 MG/1
0.5 TABLET ORAL 3 TIMES DAILY
Qty: 90 TAB | Refills: 0 | Status: SHIPPED
Start: 2017-04-10 | End: 2017-05-09 | Stop reason: SDUPTHER

## 2017-04-10 NOTE — PROGRESS NOTES
"INITIAL PSYCHIATRY EVALUATION      Chief Complaint   Patient presents with   • Establish Care   • Other     Xanax dependence         History Of Present Illness:  Emily Hopson is a 81 y.o. old female with history of CAD, atrial fibrillation, HTN, dyslipidemia, COPD on supplemental oxygen, sleep apnea on CPAP referred by Lalit Arellano M.D for evaluation of anxiety and benzodiazepine dependence. She has been on Xanax since 1992 which she states was started for her \"coronary artery vasospasms\". She was being prescribed Xanax for the last 12 or 13 years by her primary care physician who recently retired. Her new primary care physician and her new cardiologist in [place] have refused to continue her Xanax. She is on supplemental oxygen throughout the day for her underlying COPD. She reports being on 5 L in the daytime and 10 L at night along with her CPAP for sleep apnea. She reports being hospitalized a few times for management of \"respiratory failure\". She states that she takes her Xanax anywhere from 3-6 times daily depending on her day. She denies past symptoms of anxiety disorder but in the last few months since her physicians have been talking about her Xanax more and more she has been feeling anxious about that. She states that in the past Xanax would help her with chest pains but not anymore. She has tried by herself to cut back on her Xanax dose that has been unable to. She reports having \"seizure\" if she goes without her Xanax for more than 6 hours. She also reports other symptoms including \"having a choking sensation\" and palpitations. She denies alcohol consumption along with her Xanax. She currently denies any depressive symptoms and in the past had some depression after her  passed away in 1992. Endorses low energy levels. She also reports having chronic abdominal pain and occasional dizziness which keeps her from going out of the house. She enjoys spending time with her partner but is " unable to do a lot of stuff that she would like to because of her underlying medical health problems. She sleeps in the daytime so that her partner can keep an eye on her. She reports sleeping. If she takes Xanax but if not then she has a tough time having a good night sleep. Denies any problems with appetite. Denies current or past symptoms of hypomania/johny or psychosis. Denies having thoughts of wanting to hurt herself or others at this time.    Current psychiatric medications - Xanax 0.5 mg four times daily     Past Psychiatric History:  Denies history of suicide attempt or prior inpatient psychiatry hospitalization.    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   • Anesthesia      Doesn't breathe after general anesthesia   • Seizure (CMS-HCC)     • Supplemental oxygen dependent      3-4 l in day, 6 l @ noc   • Unspecified hemorrhagic conditions (CMS-HCC) 1974     History of GI bleed   • Bronchitis    • Anemia    • Personal history of venous thrombosis and embolism      Leg   • MI (myocardial infarction) (CMS-HCC)      Due to angiospasm   • Sleep apnea      Uses O2 at night and prn, also cpap   • COPD exacerbation (CMS-HCC) 12/25/2013   • H/O vitamin D deficiency 2/16/2014   • Unstable angina (CMS-HCC) 2/16/2014     Past Surgical History   Procedure Laterality Date   • Blepharoplasty  6/16/2009     Performed by AJ SUAREZ at SURGERY SAME DAY [facility] ORS   • Blepharoplasty  11/3/2009     Performed by AJ SUAREZ at SURGERY SAME DAY [facility] ORS   •  Tonsillectomy     • Hysterectomy, total abdominal     • Exploratory laparotomy     • Rib resection     • Other       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 at SURGERY SAME DAY [facility] ORS   • Colonoscopy  2014     Performed by Brett Estevez D.O. at SURGERY SAME DAY [facility] ORS       Family Psychiatric History:  Father () - dementia    Substance Use/Addiction History:  Alcohol - Denies  Nicotine - Denies  Illicit drugs - Denies    Social History:   and  ×1. She was  to her first  for 31 years and has a 55-year-old son from that marriage. She is currently in a relationship for the last 23 years and denies any stress in that relationship. 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 She lives with her partner in [place].    Allergies:  Aspirin; Butorphanol tartrate; Citrus; Codeine; Dilaudid; Heparin; Ketorolac tromethamine; Meperidine; Milk products food allergy; Morphine; Novocain; Other drug; Oxycodone; Sulfa drugs; Talwin; Tape; and Warfarin sodium    Medications:  Current Outpatient Prescriptions   Medication Sig Dispense Refill   • alprazolam (XANAX) 0.5 MG Tab Take 1 Tab by mouth 3 times a day. 90 Tab 0   • alprazolam (XANAX) 0.25 MG Tab Take 1 Tab by mouth every bedtime. 30 Tab 0   • aspirin EC (ECOTRIN) 81 MG Tablet Delayed Response Take 81 mg by mouth every day.     • Lubiprostone (AMITIZA) 8 MCG Cap Take 1 Cap by mouth 2 Times a Day. 60 Cap 6   • diltiazem CD (CARDIZEM CD) 120 MG CAPSULE SR 24 HR  "Take 1 Cap by mouth every day. Takes 120 mg  mg PM fill both 90 Cap 3   • diltiazem CD (CARDIZEM CD) 240 MG CAPSULE SR 24 HR Take 1 Cap by mouth every evening. 90 Cap 3   • ondansetron (ZOFRAN ODT) 4 MG TABLET DISPERSIBLE Take 1 Tab by mouth 4 times a day as needed for Nausea/Vomiting (for N/V). 60 Tab 5   • clotrimazole-betamethasone (LOTRISONE) 1-0.05 % Cream Apply 1 Application to affected area(s) 2 times a day. 45 g 3   • cyanocobalamin (VITAMIN B12) 500 MCG tablet Take 1 Tab by mouth every day. 30 Tab 1   • nitroglycerin (NITROSTAT) 0.4 MG SL Tab Place 0.4 mg under tongue every 5 minutes as needed for Chest Pain.     • clonidine (CATAPRES) 0.1 MG Tab Take 0.1 mg by mouth 4 times a day as needed. Every 4 hours as needed only if systolic is above 170 or diastolic 99 either   Indications: High Blood Pressure     • levalbuterol (XOPENEX HFA) 45 MCG/ACT inhaler Inhale 2 Puffs by mouth every four hours as needed for Shortness of Breath. 1 Inhaler 11   • esomeprazole (NEXIUM) 40 MG delayed-release capsule Take 1 Cap by mouth every day. 90 Cap 3   • magnesium hydroxide (MILK OF MAGNESIA) 400 MG/5ML SUSP Take 30 mL by mouth 1 time daily as needed (Constipation).       No current facility-administered medications for this visit.       Review of Symptoms:  Constitutional - Positive for fatigue  Eyes - Negative for blurry vision  HEENT - Negative for sore throat  Respiratory - Negative for shortness of breath, cough  CVS - Positive for chest pain, palpitations  GI - Negative for nausea, vomiting, diarrhea. Positive for abdominal pain and constipation  Skin - Negative for rash  Musculoskeletal - Negative for back pain  Neurological - Negative for headaches. Positive for dizziness  Psychiatric - Negative for anxiety, depression    Physical Examination:  Vital signs: /100 mmHg  Pulse 85  Ht 1.727 m (5' 7.99\")  Wt 111.131 kg (245 lb)  BMI 37.26 kg/m2    Musculoskeletal: Slow gait and ambulating with an electric " "scooter. No abnormal movements.     Mental Status Evaluation:   General: Elderly white female on supplemental oxygen and ambulating with an electric scooter, dressed in casual attire, good grooming and hygiene, in no apparent distress, calm and cooperative, good eye contact, no psychomotor agitation or retardation  Orientation: Alert and oriented to person, place and time  Recent and remote memory: Intact  Attention span and concentration: Intact  Speech: Spontaneous, normal rate, rhythm and tone  Thought Process: Circumstantial, logical and goal directed  Thought Content: Denies suicidal or homicidal ideations, intent or plan  Perception: Denies auditory or visual hallucinations. No delusions noted  Associations: Intact  Language: Appropriate  Fund of knowledge and vocabulary: Adequate  Mood: \"fine\"  Affect: Euthymic, mood congruent  Insight: Good  Judgment: Good    Impression:  1. Benzodiazepine (Xanax) use disorder, moderate     Medical Records/Labs/Diagnostic Tests Reviewed:  [facility] records - appropriate refills on Xanax    Plan:  1. Discussed diagnosis and further management. She has developed a dependence on Xanax which she has been taking for about 25 years now. Discussed higher risk of respiratory suppression and death with Xanax due to her underlying COPD requiring supplemental oxygen and sleep apnea. She is willing to work with me on a slow taper down off Xanax as tolerated. Will try to taper down dose by 0.25 mg every few months as tolerated. Will also consider maybe switching over to a longer acting benzodiazepine like Klonopin, Valium or Librium if she reports withdrawal symptoms with cutting down on her Xanax dose. Instructed her to take her Xanax on a scheduled basis from now on which will allow easier tapering down.  2. Continue Xanax 0.5 mg but decrease frequency to 3 times daily. #90 tabs with no refills faxed to her pharmacy. Instructed her to not use more or less than 3 Xanax 0.5 mg daily.  3. " Decrease bedtime dose of Xanax to 0.25 mg. #30 tabs with no refills faxed to her pharmacy.    Return to clinic in 4 weeks or sooner if symptoms worsen    The proposed treatment plan was discussed with the patient who was provided the opportunity to ask questions and make suggestions regarding alternative treatment. Patient verbalized understanding and expressed agreement with the plan.     Total face-to-face time: 80 minutes  More than 50% of face-to-face time was spent in counseling and coordinating care. Discussed benzodiazepine dependence, withdrawal symptoms and management.     Thank you for allowing me to participate in the care of this patient.    Mague Calvin M.D.  04/10/2017    CC:   Maggy Reaves M.D.  Adan Bansal M.D.    This note was created using voice recognition software (Dragon). The accuracy of the dictation is limited by the abilities of the software. I have reviewed the note prior to signing, however some errors in grammar and context are still possible. If you have any questions related to this note please do not hesitate to contact our office.

## 2017-04-24 DIAGNOSIS — I48.0 PAROXYSMAL ATRIAL FIBRILLATION (HCC): ICD-10-CM

## 2017-04-24 DIAGNOSIS — I10 ESSENTIAL HYPERTENSION: ICD-10-CM

## 2017-04-24 RX ORDER — DILTIAZEM HYDROCHLORIDE 240 MG/1
240 CAPSULE, COATED, EXTENDED RELEASE ORAL EVERY EVENING
Qty: 90 CAP | Refills: 3 | Status: SHIPPED | OUTPATIENT
Start: 2017-04-24 | End: 2018-01-01

## 2017-05-09 ENCOUNTER — OFFICE VISIT (OUTPATIENT)
Dept: BEHAVIORAL HEALTH | Facility: PHYSICIAN GROUP | Age: 82
End: 2017-05-09
Payer: MEDICARE

## 2017-05-09 VITALS — HEART RATE: 91 BPM | HEIGHT: 68 IN | SYSTOLIC BLOOD PRESSURE: 175 MMHG | DIASTOLIC BLOOD PRESSURE: 100 MMHG

## 2017-05-09 DIAGNOSIS — F41.9 ANXIETY: ICD-10-CM

## 2017-05-09 DIAGNOSIS — F13.20 XANAX USE DISORDER, MODERATE, DEPENDENCE (HCC): ICD-10-CM

## 2017-05-09 PROCEDURE — G8419 CALC BMI OUT NRM PARAM NOF/U: HCPCS | Performed by: PSYCHIATRY & NEUROLOGY

## 2017-05-09 PROCEDURE — 99215 OFFICE O/P EST HI 40 MIN: CPT | Performed by: PSYCHIATRY & NEUROLOGY

## 2017-05-09 PROCEDURE — G8598 ASA/ANTIPLAT THER USED: HCPCS | Performed by: PSYCHIATRY & NEUROLOGY

## 2017-05-09 PROCEDURE — 4040F PNEUMOC VAC/ADMIN/RCVD: CPT | Mod: 8P | Performed by: PSYCHIATRY & NEUROLOGY

## 2017-05-09 PROCEDURE — 1036F TOBACCO NON-USER: CPT | Performed by: PSYCHIATRY & NEUROLOGY

## 2017-05-09 PROCEDURE — G8432 DEP SCR NOT DOC, RNG: HCPCS | Performed by: PSYCHIATRY & NEUROLOGY

## 2017-05-09 PROCEDURE — 1101F PT FALLS ASSESS-DOCD LE1/YR: CPT | Performed by: PSYCHIATRY & NEUROLOGY

## 2017-05-09 RX ORDER — ALPRAZOLAM 0.5 MG/1
0.5 TABLET ORAL 3 TIMES DAILY PRN
Qty: 90 TAB | Refills: 1 | Status: SHIPPED
Start: 2017-05-09 | End: 2017-01-01 | Stop reason: SDUPTHER

## 2017-05-09 RX ORDER — ESCITALOPRAM OXALATE 10 MG/1
TABLET ORAL
Qty: 30 TAB | Refills: 1 | Status: SHIPPED | OUTPATIENT
Start: 2017-05-09 | End: 2017-01-01

## 2017-05-09 NOTE — PATIENT INSTRUCTIONS
- Continue Xanax 0.5 mg three times daily as needed for anxiety  - Stop Xanax 0.25 mg  - Start Lexapro 5 mg daily x 2 weeks and then increase to 10 mg daily

## 2017-05-09 NOTE — MR AVS SNAPSHOT
"        Emily Hopson   2017 4:00 PM   Office Visit   MRN: [id number]    Department:  Behavioral [facility] [address]   Dept Phone:  [phone]    Description:  Female : 1935   Provider:  Mague Calvin M.D.           Reason for Visit     Follow-Up anxiety      Allergies as of 2017     Allergen Noted Reactions    Codeine 10/23/2014   Rash, Vomiting    Pt sates rash and vomiting.    Dilaudid [Hydromorphone Hcl] 2017       Epinephrine 04/10/2017   Palpitations    Per pt, allergic to epinephrine in local anesthetic     Erythromycin 04/10/2017   Diarrhea, Vomiting    Heparin 10/23/2014       Per historical: thrombocytopenia.    Meperidine 10/23/2014   Rash, Vomiting    Okay with Promethazine.    Milk Products Food Allergy 10/30/2015       Sensitivity to it. Causes bloating.     Morphine 10/23/2014   Rash, Vomiting    Novocain 10/23/2014       With epinephrine, causes patient to go into A-Fib.    Other Drug 2011       States allergic to most local anesthetics with epi    Oxycodone 2017       Sulfa Drugs 10/23/2014       Pt unsure of reaction.    Talwin 10/23/2014       \"Makes me go bezerk.\"    Tape 2009   Rash    Pt states rash.    Warfarin Sodium 10/23/2014       Due to also taking Nexium, pt's INR was 21.      You were diagnosed with     Xanax use disorder, moderate, dependence (CMS-HCC)   [5796670]       Anxiety   [450805]         Vital Signs     Blood Pressure Pulse Height Smoking Status          175/100 mmHg 91 1.727 m (5' 7.99\") Former Smoker        Basic Information     Date Of Birth Sex Race Ethnicity Preferred Language    1935 Female White Non- English      Your appointments     2017  3:20 PM   Established Patient with Maggy Reaves M.D.   [facility] [address]    [address]  [address]  [place] [id number] [phone]           You will be receiving a confirmation call a few days before your appointment from our " automated call confirmation system.              Problem List              ICD-10-CM Priority Class Noted - Resolved    Sleep apnea G47.30   12/25/2013 - Present    Anemia D64.9   12/31/2013 - Present    Paroxysmal atrial fibrillation (CMS-HCC) I48.0   12/31/2013 - Present    Anxiety F41.9   12/31/2013 - Present    Normocytic anemia D64.9   1/18/2014 - Present    Obesity E66.9   2/16/2014 - Present    Chronic pain G89.29   2/16/2014 - Present    H/O vitamin D deficiency Z86.39   2/16/2014 - Present    Chronic abdominal pain R10.9, G89.29   2/16/2014 - Present    CAD (coronary artery disease), native coronary artery I25.10   2/16/2014 - Present    History of colonic polyps Z86.010   7/1/2014 - Present    Chest pain R07.9   10/20/2014 - Present    HTN (hypertension) I10   10/24/2014 - Present    Fatigue R53.83   3/10/2015 - Present    Nausea R11.0   3/10/2015 - Present    Constipation K59.00   5/11/2015 - Present    Colitis K52.9   5/27/2015 - Present    Depressive disorder, not elsewhere classified F32.9   8/4/2015 - Present    Impaired fasting glucose R73.01   8/5/2015 - Present    Leukopenia D72.819   11/3/2015 - Present    COPD (chronic obstructive pulmonary disease) (CMS-HCC) J44.9   11/3/2015 - Present    Low vitamin B12 level E53.8   12/10/2015 - Present    Dermatophytosis, hand B35.2   12/10/2015 - Present    Multiple lung nodules on CT R91.8   8/4/2016 - Present    Lung metastases (CMS-HCC) C78.00   8/4/2016 - Present    Benzodiazepine dependence (CMS-HCC) F13.20   2/13/2017 - Present    Xanax use disorder, moderate, dependence (CMS-HCC) F13.20   4/10/2017 - Present      Health Maintenance        Date Due Completion Dates    IMM DTaP/Tdap/Td Vaccine (1 - Tdap) 5/10/1956 ---    IMM ZOSTER VACCINE 5/10/1997 ---    IMM PNEUMOCOCCAL 65+ (ADULT) LOW/MEDIUM RISK SERIES (1 of 2 - PCV13) 5/10/2002 ---    COLONOSCOPY 7/23/2024 7/23/2014, 7/10/2014 (Prv Comp)    Override on 7/10/2014: Previously completed               Current Immunizations     Influenza TIV (IM) 10/1/2013    Influenza Vaccine Adult HD 10/20/2014  8:48 AM    Pneumococcal Vaccine (UF)Historical Data 10/1/2013      Below and/or attached are the medications your provider expects you to take. Review all of your home medications and newly ordered medications with your provider and/or pharmacist. Follow medication instructions as directed by your provider and/or pharmacist. Please keep your medication list with you and share with your provider. Update the information when medications are discontinued, doses are changed, or new medications (including over-the-counter products) are added; and carry medication information at all times in the event of emergency situations     Allergies:  CODEINE - Rash,Vomiting     DILAUDID - (reactions not documented)     EPINEPHRINE - Palpitations     ERYTHROMYCIN - Diarrhea,Vomiting     HEPARIN - (reactions not documented)     MEPERIDINE - Rash,Vomiting     MILK PRODUCTS FOOD ALLERGY - (reactions not documented)     MORPHINE - Rash,Vomiting     NOVOCAIN - (reactions not documented)     OTHER DRUG - (reactions not documented)     OXYCODONE - (reactions not documented)     SULFA DRUGS - (reactions not documented)     TALWIN - (reactions not documented)     TAPE - Rash     WARFARIN SODIUM - (reactions not documented)               Medications  Valid as of: May 09, 2017 -  4:36 PM    Generic Name Brand Name Tablet Size Instructions for use    ALPRAZolam (Tab) XANAX 0.5 MG Take 1 Tab by mouth 3 times a day as needed for Anxiety.        Aspirin (Tablet Delayed Response) ECOTRIN 81 MG Take 81 mg by mouth every day.        CloNIDine HCl (Tab) CATAPRES 0.1 MG Take 0.1 mg by mouth 4 times a day as needed. Every 4 hours as needed only if systolic is above 170 or diastolic 99 either   Indications: High Blood Pressure        Clotrimazole-Betamethasone (Cream) LOTRISONE 1-0.05 % Apply 1 Application to affected area(s) 2 times a day.         Cyanocobalamin (Tab) vitamin b12 500 MCG Take 1 Tab by mouth every day.        DilTIAZem HCl Coated Beads (CAPSULE SR 24 HR) CARDIZEM  MG Take 1 Cap by mouth every day. Takes 120 mg  mg PM fill both        DilTIAZem HCl Coated Beads (CAPSULE SR 24 HR) CARDIZEM  MG Take 1 Cap by mouth every evening.        Escitalopram Oxalate (Tab) LEXAPRO 10 MG Take 1/2 tablet daily x 2 weeks and then increase to 1 tablet daily        Esomeprazole Magnesium (CAPSULE DELAYED RELEASE) NEXIUM 40 MG Take 1 Cap by mouth every day.        Levalbuterol Tartrate (Aerosol) XOPENEX HFA 45 MCG/ACT Inhale 2 Puffs by mouth every four hours as needed for Shortness of Breath.        Lubiprostone (Cap) Lubiprostone 8 MCG Take 1 Cap by mouth 2 Times a Day.        Magnesium Hydroxide (Suspension) MILK OF MAGNESIA 400 MG/5ML Take 30 mL by mouth 1 time daily as needed (Constipation).        Nitroglycerin (SL Tab) NITROSTAT 0.4 MG Place 0.4 mg under tongue every 5 minutes as needed for Chest Pain.        Ondansetron (TABLET DISPERSIBLE) ZOFRAN ODT 4 MG Take 1 Tab by mouth 4 times a day as needed for Nausea/Vomiting (for N/V).        .                 Medicines prescribed today were sent to:     [facility] #[id number] - [place] - [address]    [address] [place] [address]    Phone: [phone] Fax: [phone]    Open 24 Hours?: No    [facility] #[id number] - [place] - [address] [place] [address]    Phone: [phone] Fax: [phone]    Open 24 Hours?: No      Medication refill instructions:       If your prescription bottle indicates you have medication refills left, it is not necessary to call your provider’s office. Please contact your pharmacy and they will refill your medication.    If your prescription bottle indicates you do not have any refills left, you may request refills at any time through one of the following ways: The online [employer] system (except Urgent Care), by  calling your provider’s office, or by asking your pharmacy to contact your provider’s office with a refill request. Medication refills are processed only during regular business hours and may not be available until the next business day. Your provider may request additional information or to have a follow-up visit with you prior to refilling your medication.   *Please Note: Medication refills are assigned a new Rx number when refilled electronically. Your pharmacy may indicate that no refills were authorized even though a new prescription for the same medication is available at the pharmacy. Please request the medicine by name with the pharmacy before contacting your provider for a refill.        Instructions    - Continue Xanax 0.5 mg three times daily as needed for anxiety  - Stop Xanax 0.25 mg  - Start Lexapro 5 mg daily x 2 weeks and then increase to 10 mg daily          MyChart Access Code: Activation code not generated  Current MyChart Status: Active

## 2017-05-09 NOTE — PROGRESS NOTES
"PSYCHIATRY FOLLOW-UP NOTE      Chief Complaint   Patient presents with   • Follow-Up     anxiety         History Of Present Illness:  Emily Hopson is a 81 y.o. old female with CAD, atrial fibrillation, HTN, dyslipidemia, COPD on supplemental oxygen, sleep apnea on CPAP comes in today for follow up, was last seen for an initial evaluation 4 weeks ago. She reports doing the same since her last visit here. He was able to cut down on her dose of Xanax from 2 mg to 1.75 mg and felt that things were harder for her which she was able to manage it. She has been using her Xanax only when she starts experiencing withdrawal symptoms which she reports is feeling diaphoretic and having \"jerky movements\". She talked about having a grand mal seizure in the past but unable to correlate with her Xanax use at that time. Continues to endorse other physical health symptoms including abdominal pain, chest pain and constipation and is frustrated and overwhelmed because of that. She has been feeling somewhat depressed because of that but denies any anhedonia. Denies passive or active thoughts of wanting to hurt herself or others. Energy has been low. Sleep and appetite at baseline.     Social History:    and  ×1. She was  to her first  for 31 years and has a 55-year-old son from that marriage. She is currently in a relationship for the last 23 years and lives with her partner in [place].    Substance Use:  Denies recent use of alcohol, nicotine or illicit drugs    Past Medication Trials:  None     Medications:  Current Outpatient Prescriptions   Medication Sig Dispense Refill   • escitalopram (LEXAPRO) 10 MG Tab Take 1/2 tablet daily x 2 weeks and then increase to 1 tablet daily 30 Tab 1   • alprazolam (XANAX) 0.5 MG Tab Take 1 Tab by mouth 3 times a day as needed for Anxiety. 90 Tab 1   • diltiazem CD (CARDIZEM CD) 240 MG CAPSULE SR 24 HR Take 1 Cap by mouth every evening. 90 Cap 3   • aspirin EC " "(ECOTRIN) 81 MG Tablet Delayed Response Take 81 mg by mouth every day.     • Lubiprostone (AMITIZA) 8 MCG Cap Take 1 Cap by mouth 2 Times a Day. 60 Cap 6   • diltiazem CD (CARDIZEM CD) 120 MG CAPSULE SR 24 HR Take 1 Cap by mouth every day. Takes 120 mg  mg PM fill both 90 Cap 3   • ondansetron (ZOFRAN ODT) 4 MG TABLET DISPERSIBLE Take 1 Tab by mouth 4 times a day as needed for Nausea/Vomiting (for N/V). 60 Tab 5   • clotrimazole-betamethasone (LOTRISONE) 1-0.05 % Cream Apply 1 Application to affected area(s) 2 times a day. 45 g 3   • cyanocobalamin (VITAMIN B12) 500 MCG tablet Take 1 Tab by mouth every day. 30 Tab 1   • nitroglycerin (NITROSTAT) 0.4 MG SL Tab Place 0.4 mg under tongue every 5 minutes as needed for Chest Pain.     • clonidine (CATAPRES) 0.1 MG Tab Take 0.1 mg by mouth 4 times a day as needed. Every 4 hours as needed only if systolic is above 170 or diastolic 99 either   Indications: High Blood Pressure     • levalbuterol (XOPENEX HFA) 45 MCG/ACT inhaler Inhale 2 Puffs by mouth every four hours as needed for Shortness of Breath. 1 Inhaler 11   • esomeprazole (NEXIUM) 40 MG delayed-release capsule Take 1 Cap by mouth every day. 90 Cap 3   • magnesium hydroxide (MILK OF MAGNESIA) 400 MG/5ML SUSP Take 30 mL by mouth 1 time daily as needed (Constipation).       No current facility-administered medications for this visit.       Review Of Systems:    Constitutional - Positive for fatigue  Respiratory - Negative for shortness of breath, cough  CVS - Positive for occasional chest pain, palpitations  GI - Negative for nausea, vomiting, diarrhea. Positive for abdominal pain and constipation  Musculoskeletal - Negative for back pain  Neurological - Negative for headaches  Psychiatric - Positive for anxiety    Physical Examination:  Vital signs: /100 mmHg  Pulse 91  Ht 1.727 m (5' 7.99\")    Musculoskeletal: Slow gait, ambulating with an electric scooter. No abnormal movements.     Mental Status " "Evaluation:   General: Elderly obese white female, ambulating with an electric scooter, dressed in casual attire, good grooming and hygiene, in no apparent distress, calm and cooperative, good eye contact, no psychomotor agitation or retardation  Orientation: Alert and oriented to person, place and time  Recent and remote memory: Grossly intact  Attention span and concentration: Grossly intact  Speech: Spontaneous, normal rate, rhythm and tone  Thought Process: Linear, logical and goal directed  Thought Content: Denies suicidal or homicidal ideations, intent or plan  Perception: Denies auditory or visual hallucinations. No delusions noted  Associations: Intact  Language: Appropriate  Fund of knowledge and vocabulary: Grossly adequate  Mood: \"not too good\"  Affect: Anxious at times, mood congruent  Insight: Good  Judgment: Good      Impression:  1. Benzodiazepine (Xanax) use disorder, moderate   2. Unspecified anxiety disorder    Medical Records/Labs/Diagnostic Tests Reviewed:  [facility] records - appropriate refills    Plan:  1. Decrease Xanax to 0.5 mg 3 times daily. #90 tabs with one refill faxed to her pharmacy.   2. Start Lexapro 5 mg daily for 2 weeks and then increase to 10 mg daily for anxiety symptoms.  3. If she is doing okay in regards to her anxiety symptoms with the addition of Lexapro, discussed switching her over to a longer acting benzodiazepine like Klonopin which might be easier to taper off as compared to Xanax.    Return to clinic in 2 months or sooner if symptoms worsen    The proposed treatment plan was discussed with the patient who was provided the opportunity to ask questions and make suggestions regarding alternative treatment. Patient verbalized understanding and expressed agreement with the plan.     Total face-to-face time: 40 minutes  More than 50% of face-to-face time was spent in counseling and coordinating care. Discussed benzodiazepine dependence and anxiety management with SSRI " yeimy Calvin M.D.  05/09/2017    This note was created using voice recognition software (Dragon). The accuracy of the dictation is limited by the abilities of the software. I have reviewed the note prior to signing, however some errors in grammar and context are still possible. If you have any questions related to this note please do not hesitate to contact our office.

## 2017-05-10 ENCOUNTER — PATIENT OUTREACH (OUTPATIENT)
Dept: HEALTH INFORMATION MANAGEMENT | Facility: OTHER | Age: 82
End: 2017-05-10

## 2017-05-23 NOTE — TELEPHONE ENCOUNTER
Called her back and she has been having more problems with anxiety since she was started on Lexapro about 2 weeks ago and her Xanax dose was cut down by another 0.25 mg. She stopped taking Lexapro this last weekend and is not feeling good about that. Discussed that Lexapro can sometimes worsen anxiety initially and combined that with cutting back on the Xanax dose might have been really hard on her. Discussed discontinuing Lexapro at this time and continuing with only Xanax 0.5 mg 3 times daily and she is okay with that. She understands that she has been on Xanax for a very long time and getting her off Xanax will be really difficult. She plans on continuing with the same dose of Xanax until her follow-up visit with me.    ----- Message from Jazmyn Acosta sent at 5/22/2017  3:43 PM PDT -----  Regarding: Hard time coming off Xanax  Contact: [phone]  PT called she states that she is not doing well on the medicine that she is taking. Eszitalopram 10 MG 1 X Daily - take 1/2.     She wants her Xanax 0.5 MG every 8 hours back -     Stated that she stopped taking the Eszitalopram, and took her old Xanax, she then slept for 20 hours straight.  The RX made her feel like she is dying, unable to sleep, has a choking feeling, unable to eat, chest pains, abdominal pains, sweats, chills, barely able to walk, passed out a few times, the nausea is getting worse.     Asked if she went to see her PCP or ER for the symptoms, she said no, she called her Cardiologist and he told her it would take time to get use to not having Xanax in her system, after being on it for 25 years.     She doesn't feel that she can do this anymore, wants Xanax

## 2017-07-11 NOTE — PROGRESS NOTES
PSYCHIATRY FOLLOW-UP NOTE      Chief Complaint   Patient presents with   • Follow-Up     anxiety         History Of Present Illness:  Emily Hopson is a 82 y.o. old female with CAD, atrial fibrillation, s/p pacemaker placement, HTN, dyslipidemia, COPD on supplemental oxygen, sleep apnea on CPAP comes in today for follow up, was last seen 2 months ago. She reported doing fine in regards to her anxiety but has been struggling with a lot of physical health problems. She has been having a lot of constipation which has been causing a lot of abdominal pain. She is also having more shortness of breath and occasional palpitations. She denies any changes in her oxygen requirement. She has been feeling more weak and fatigued and is struggling with her appetite. She denies any weight loss. She has been spending majority of her day in bed and has been ambulating only to the bathroom. She has been having some occasional lightheadedness but denies feeling dizzy or having any balance problems. She denies any recent falls. She has really good support from her partner who has been taking care of her. She was started on Lexapro on her last visit which she was unable to tolerate due to increase in her anxiety symptoms. However she was able to cut down on her Xanax and is now taking 3 tablets in 24 hours. She would eventually like to discontinue Xanax if possible. She reports feeling sad when her physical pain gets worse but denies persistent or prolonged depressive symptoms. Denies having thoughts of wanting to hurt herself or others.    Social History:    and  ×1. She was  to her first  for 31 years and has a 55-year-old son from that marriage. She is currently in a relationship for the last 23 years and lives with her partner in [place].    Substance Use:  Denies recent use of alcohol, nicotine or illicit drugs    Past Medication Trials:  None     Medications:  Current Outpatient Prescriptions    Medication Sig Dispense Refill   • alprazolam (XANAX) 0.5 MG Tab Take 1 Tab by mouth 3 times a day as needed for Anxiety. 90 Tab 2   • diltiazem CD (CARDIZEM CD) 240 MG CAPSULE SR 24 HR Take 1 Cap by mouth every evening. 90 Cap 3   • aspirin EC (ECOTRIN) 81 MG Tablet Delayed Response Take 81 mg by mouth every day.     • Lubiprostone (AMITIZA) 8 MCG Cap Take 1 Cap by mouth 2 Times a Day. 60 Cap 6   • diltiazem CD (CARDIZEM CD) 120 MG CAPSULE SR 24 HR Take 1 Cap by mouth every day. Takes 120 mg  mg PM fill both 90 Cap 3   • ondansetron (ZOFRAN ODT) 4 MG TABLET DISPERSIBLE Take 1 Tab by mouth 4 times a day as needed for Nausea/Vomiting (for N/V). 60 Tab 5   • clotrimazole-betamethasone (LOTRISONE) 1-0.05 % Cream Apply 1 Application to affected area(s) 2 times a day. 45 g 3   • cyanocobalamin (VITAMIN B12) 500 MCG tablet Take 1 Tab by mouth every day. 30 Tab 1   • nitroglycerin (NITROSTAT) 0.4 MG SL Tab Place 0.4 mg under tongue every 5 minutes as needed for Chest Pain.     • clonidine (CATAPRES) 0.1 MG Tab Take 0.1 mg by mouth 4 times a day as needed. Every 4 hours as needed only if systolic is above 170 or diastolic 99 either   Indications: High Blood Pressure     • levalbuterol (XOPENEX HFA) 45 MCG/ACT inhaler Inhale 2 Puffs by mouth every four hours as needed for Shortness of Breath. 1 Inhaler 11   • esomeprazole (NEXIUM) 40 MG delayed-release capsule Take 1 Cap by mouth every day. 90 Cap 3   • magnesium hydroxide (MILK OF MAGNESIA) 400 MG/5ML SUSP Take 30 mL by mouth 1 time daily as needed (Constipation).       No current facility-administered medications for this visit.       Review Of Systems:    Constitutional - Positive for fatigue, decreased appetite  Respiratory - Positive for shortness of breath. Negative for cough  CVS - Negative for chest pain. Positive for occasional palpitations  GI - Negative for nausea, vomiting, diarrhea. Positive for abdominal pain and constipation  Musculoskeletal - Negative  "for back pain  Neurological - Negative for headaches  Psychiatric - Positive for anxiety, poor sleep    Physical Examination:  Vital signs: /82 mmHg  Pulse 96  Ht 1.727 m (5' 7.99\")  Wt 112.492 kg (248 lb)  BMI 37.72 kg/m2    Musculoskeletal: Slow gait, ambulating with an electric scooter. No abnormal movements.     Mental Status Evaluation:   General: Elderly obese white female, ambulating with an electric scooter, dressed in casual attire, good grooming and hygiene, in no apparent distress, calm and cooperative, good eye contact, no psychomotor agitation or retardation  Orientation: Alert and oriented to person, place and time  Recent and remote memory: Grossly intact  Attention span and concentration: Grossly intact  Speech: Spontaneous, normal rate, rhythm and tone  Thought Process: Linear, logical and goal directed  Thought Content: Denies suicidal or homicidal ideations, intent or plan  Perception: Denies auditory or visual hallucinations. No delusions noted  Associations: Intact  Language: Appropriate  Fund of knowledge and vocabulary: Grossly adequate  Mood: \"not too good\"  Affect: Anxious at times, mood congruent  Insight: Good  Judgment: Good      Impression:  1. Benzodiazepine (Xanax) use disorder, moderate   2. Unspecified anxiety disorder    Medical Records/Labs/Diagnostic Tests Reviewed:  [place]  records - appropriate refills, no abuse suspected    Plan:  1. Continue Xanax to 0.5 mg 3 times daily. #90 tabs with 2 refills faxed to her pharmacy. She has been dealing with a lot of physical health problems and she might not be able to tolerate a further taper down in her Xanax dose. Discussed continuing Xanax at the same dose for another 3 months. If she is doing better physically will try to cut her dose down by another 0.25 mg.  2. Discontinue Lexapro due to side effects.    Return to clinic in 3 months or sooner if symptoms worsen    The proposed treatment plan was discussed with the patient " who was provided the opportunity to ask questions and make suggestions regarding alternative treatment. Patient verbalized understanding and expressed agreement with the plan.     Total face-to-face time: 35 minutes  More than 50% of face-to-face time was spent in counseling and coordinating care. Discussed benzodiazepine dependence and management.    Mague Calvin M.D.  07/11/2017    This note was created using voice recognition software (Dragon). The accuracy of the dictation is limited by the abilities of the software. I have reviewed the note prior to signing, however some errors in grammar and context are still possible. If you have any questions related to this note please do not hesitate to contact our office.

## 2017-09-18 NOTE — PROGRESS NOTES
CC: Multiple medical issues    HPI:   Emily presents today to discuss her multiple medical issues.    Chronic anxiety, patient has been on high-dose benzodiazepine's for the last 25 years. She reports any efforts to titrate down have led to seizure . She has medications now( will last her till 3/30/17) but will run  out of meds before her appt with psychiatry on 4/11.Patient gave a h/o respiratory depression in the past ( was intubated in the ICU). Discuss risk of benzo in elderly patient stated that she understand that but she can not stop it. However advised to follow up with psychiatrist.    Chronic obstructive pulmonary disease, she has been stable,has been on oxygen 2L/min without a problem.Takes on Xopenex inhaler as needed.     Chronic atrial fibrillation, she has been stable , and asymptomatic, denies palpitation..has been no Cardizem. She also has a cardiac pacemaker , as per patient was inserted because her heart beats goes down ( possibly SSS), currently asymptomatic.She follows up with cardiology Dr Marcum , who is leaving the town. Wants a referral to an other cardiologist    Essential hypertension, BP has been adequately controlled on current medication. Denies headache, chest pain, and SOB.has been on Cardizem for both BP control, and HR control.     She has been having a chronic abdominal discomfort, patient has GI but she said he did nothing for her , and she needs a second opinion.    Patient stated she has been having a chronic urinary incontinence, sometimes difficulty.Wants a referral to urology.Denies blood in the urine, denies fever, and chills, no burning urination    Due for flu shot.          Patient Active Problem List    Diagnosis Date Noted   • COPD (chronic obstructive pulmonary disease) (CMS-[facility]) 11/03/2015     Priority: High   • Colitis 05/27/2015     Priority: High   • CAD (coronary artery disease), native coronary artery 02/16/2014     Priority: High   • Anxiety 12/31/2013      Priority: High   • Leukopenia 11/03/2015     Priority: Medium   • Impaired fasting glucose 08/05/2015     Priority: Medium   • Depressive disorder, not elsewhere classified 08/04/2015     Priority: Medium   • Constipation 05/11/2015     Priority: Medium   • Fatigue 03/10/2015     Priority: Medium   • HTN (hypertension) 10/24/2014     Priority: Medium   • Chest pain 10/20/2014     Priority: Medium   • Chronic abdominal pain 02/16/2014     Priority: Medium   • Normocytic anemia 01/18/2014     Priority: Medium   • Anemia 12/31/2013     Priority: Medium   • Paroxysmal atrial fibrillation (CMS-HCC) 12/31/2013     Priority: Medium   • Sleep apnea 12/25/2013     Priority: Medium   • Low vitamin B12 level 12/10/2015     Priority: Low   • Dermatophytosis, hand 12/10/2015     Priority: Low   • Nausea 03/10/2015     Priority: Low   • History of colonic polyps 07/01/2014     Priority: Low   • Obesity 02/16/2014     Priority: Low   • H/O vitamin D deficiency 02/16/2014     Priority: Low   • Xanax use disorder, moderate, dependence (CMS-[facility]) 04/10/2017   • Benzodiazepine dependence (CMS-[facility]) 02/13/2017   • Multiple lung nodules on CT 08/04/2016   • Lung metastases (CMS-[facility]) 08/04/2016       Current Outpatient Prescriptions   Medication Sig Dispense Refill   • alprazolam (XANAX) 0.5 MG Tab Take 1 Tab by mouth 3 times a day as needed for Anxiety. 90 Tab 2   • diltiazem CD (CARDIZEM CD) 240 MG CAPSULE SR 24 HR Take 1 Cap by mouth every evening. 90 Cap 3   • aspirin EC (ECOTRIN) 81 MG Tablet Delayed Response Take 81 mg by mouth every day.     • Lubiprostone (AMITIZA) 8 MCG Cap Take 1 Cap by mouth 2 Times a Day. 60 Cap 6   • diltiazem CD (CARDIZEM CD) 120 MG CAPSULE SR 24 HR Take 1 Cap by mouth every day. Takes 120 mg  mg PM fill both 90 Cap 3   • ondansetron (ZOFRAN ODT) 4 MG TABLET DISPERSIBLE Take 1 Tab by mouth 4 times a day as needed for Nausea/Vomiting (for N/V). 60 Tab 5   • clonidine (CATAPRES) 0.1 MG Tab Take 0.1 mg by  "mouth 4 times a day as needed. Every 4 hours as needed only if systolic is above 170 or diastolic 99 either   Indications: High Blood Pressure     • levalbuterol (XOPENEX HFA) 45 MCG/ACT inhaler Inhale 2 Puffs by mouth every four hours as needed for Shortness of Breath. 1 Inhaler 11   • clotrimazole-betamethasone (LOTRISONE) 1-0.05 % Cream Apply 1 Application to affected area(s) 2 times a day. 45 g 3   • cyanocobalamin (VITAMIN B12) 500 MCG tablet Take 1 Tab by mouth every day. 30 Tab 1   • nitroglycerin (NITROSTAT) 0.4 MG SL Tab Place 0.4 mg under tongue every 5 minutes as needed for Chest Pain.     • esomeprazole (NEXIUM) 40 MG delayed-release capsule Take 1 Cap by mouth every day. 90 Cap 3   • magnesium hydroxide (MILK OF MAGNESIA) 400 MG/5ML SUSP Take 30 mL by mouth 1 time daily as needed (Constipation).       No current facility-administered medications for this visit.          Allergies as of 09/18/2017 - Reviewed 09/18/2017   Allergen Reaction Noted   • Codeine Rash and Vomiting 10/23/2014   • Dilaudid [hydromorphone hcl]  02/13/2017   • Epinephrine Palpitations 04/10/2017   • Erythromycin Diarrhea and Vomiting 04/10/2017   • Fentanyl  09/18/2017   • Heparin  10/23/2014   • Meperidine Rash and Vomiting 10/23/2014   • Milk products food allergy  10/30/2015   • Morphine Rash and Vomiting 10/23/2014   • Novocain  10/23/2014   • Other drug  03/21/2011   • Oxycodone  02/13/2017   • Sulfa drugs  10/23/2014   • Talwin  10/23/2014   • Tape Rash 11/03/2009   • Warfarin sodium  10/23/2014        ROS: Denies any chest pain, Shortness of breath, Changes bowel or bladder, Lower extremity edema.    Physical Exam:  /70   Pulse 71   Temp 36.1 °C (97 °F)   Resp 14   Ht 1.727 m (5' 8\")   Wt 105.9 kg (233 lb 7.5 oz)   SpO2 90%   BMI 35.50 kg/m²   Gen.: Well-developed, well-nourished, no apparent distress,pleasant and cooperative with the examination  Skin:  Warm and dry with good turgor. No rashes or suspicious " lesions in visible areas  HEENT:Sinuses nontender with palpation, TMs clear, nares patent with pink mucosa and clear rhinorrhea,no septal deviation ,polyps or lesions. lips without lesions, oropharynx clear.  Neck: Trachea midline,no masses or adenopathy. No JVD.  Cor: Regular rate and rhythm without murmur, gallop or rub.  Lungs: Respirations unlabored.Clear to auscultation with equal breath sounds bilaterally. No wheezes, rhonchi.  Extremities: No cyanosis, clubbing or edema.        Assessment and Plan.   82 y.o. female     1. Chronic anxiety  Xanax was tapered to 0.5 mg tid.  Continue follow up with Psychiatry.    2. Chronic obstructive pulmonary disease, unspecified COPD type (CMS-HCC)  Stable   continue on oxygen 2L/min without a problem.Takes on Xopenex inhaler as needed.    3. Paroxysmal atrial fibrillation (CMS-HCC)  Stable.continue on Cardizem.  Will refer to cardiology ( her cardiologist left the town).    - REFERRAL TO CARDIOLOGY    4. Essential hypertension  Has been adequately controlled on current medication. Denies headache, chest pain, and SOB.  Continue on current medication.    - REFERRAL TO CARDIOLOGY    5. Cardiac pacemaker  Stable asymptomatic.  Will refer to cardiology ( her cardiologist left the town).    - REFERRAL TO CARDIOLOGY    6. Healthcare maintenance  Need the flu shot.    7. Abdominal discomfort  Chronic.  Patient has GI ( she siad he did nothinh for her need a second opinion.    - REFERRAL TO GASTROENTEROLOGY    8. Stress incontinence of urine  Patient has a chronic urinary incontinence, sometimes difficulty.  Wants a referral to urology.    - REFERRAL TO UROLOGY    9. Needs flu shot    - INFLUENZA VACCINE, HIGH DOSE (65+ ONLY)

## 2017-10-11 PROBLEM — F41.9 ANXIETY DISORDER: Status: ACTIVE | Noted: 2017-01-01

## 2017-10-11 PROBLEM — F13.20 BENZODIAZEPINE DEPENDENCE (HCC): Status: RESOLVED | Noted: 2017-02-13 | Resolved: 2017-01-01

## 2017-10-11 NOTE — PROGRESS NOTES
PSYCHIATRY FOLLOW-UP NOTE      Chief Complaint   Patient presents with   • Follow-Up     anxiety         History Of Present Illness:  Emily Hopson is a 82 y.o. old female with CAD, atrial fibrillation, s/p pacemaker placement, HTN, dyslipidemia, COPD on supplemental oxygen, sleep apnea on CPAP comes in today for follow up, was last seen 3 months ago. She continues to endorse several physical health problems and is afraid that she will die soon. She has been having chest pain and palpitations but has not gone to the ER are spoken with any of her other physicians as she is afraid that if she goes to the hospital she will not be getting her Xanax. She has also been having urinary hesitancy, constipation, decreased appetite and abdominal bloating for the last several months. She continues to use Xanax up to 3 times a day which she feels helps her both with her anxiety and atrial fibrillation. She is wanting to get off Xanax so that she can take some narcotic pain medications for her back pain which seems to be bothering her. She denies feeling depressed. She does have some passive thoughts of death because of her physical health problems but denies active thoughts, intent or plan of wanting to hurt herself or others. Denies any recent balance problems of falls    Social History:    and  ×1. She was  to her first  for 31 years and has a 55-year-old son from that marriage. She is currently in a relationship for the last 23 years and lives with her partner in [place].    Substance Use:  Denies recent use of alcohol, nicotine or illicit drugs    Past Medication Trials:  None     Medications:  Current Outpatient Prescriptions   Medication Sig Dispense Refill   • alprazolam (XANAX) 0.5 MG Tab Take 1 Tab by mouth 3 times a day as needed for Anxiety. 90 Tab 2   • diltiazem CD (CARDIZEM CD) 240 MG CAPSULE SR 24 HR Take 1 Cap by mouth every evening. 90 Cap 3   • aspirin EC (ECOTRIN) 81 MG  "Tablet Delayed Response Take 81 mg by mouth every day.     • Lubiprostone (AMITIZA) 8 MCG Cap Take 1 Cap by mouth 2 Times a Day. 60 Cap 6   • diltiazem CD (CARDIZEM CD) 120 MG CAPSULE SR 24 HR Take 1 Cap by mouth every day. Takes 120 mg  mg PM fill both 90 Cap 3   • ondansetron (ZOFRAN ODT) 4 MG TABLET DISPERSIBLE Take 1 Tab by mouth 4 times a day as needed for Nausea/Vomiting (for N/V). 60 Tab 5   • clotrimazole-betamethasone (LOTRISONE) 1-0.05 % Cream Apply 1 Application to affected area(s) 2 times a day. 45 g 3   • cyanocobalamin (VITAMIN B12) 500 MCG tablet Take 1 Tab by mouth every day. 30 Tab 1   • nitroglycerin (NITROSTAT) 0.4 MG SL Tab Place 0.4 mg under tongue every 5 minutes as needed for Chest Pain.     • clonidine (CATAPRES) 0.1 MG Tab Take 0.1 mg by mouth 4 times a day as needed. Every 4 hours as needed only if systolic is above 170 or diastolic 99 either   Indications: High Blood Pressure     • levalbuterol (XOPENEX HFA) 45 MCG/ACT inhaler Inhale 2 Puffs by mouth every four hours as needed for Shortness of Breath. 1 Inhaler 11   • esomeprazole (NEXIUM) 40 MG delayed-release capsule Take 1 Cap by mouth every day. 90 Cap 3   • magnesium hydroxide (MILK OF MAGNESIA) 400 MG/5ML SUSP Take 30 mL by mouth 1 time daily as needed (Constipation).       No current facility-administered medications for this visit.        Review Of Systems:    Constitutional - Positive for fatigue, decreased appetite  Respiratory - Positive for shortness of breath. Negative for cough  CVS - Negative for chest pain. Positive for occasional palpitations and chest pain  GI - Negative for nausea, vomiting, diarrhea. Positive for abdominal bloating and constipation  Musculoskeletal - Positive for back pain  Neurological - Negative for headaches  Psychiatric - Positive for anxiety, poor sleep    Physical Examination:  Vital signs: /89   Pulse 81   Ht 1.727 m (5' 8\")     Musculoskeletal: Slow gait, ambulating with an " "electric scooter. No abnormal movements.     Mental Status Evaluation:   General: Elderly obese white female, ambulating with an electric scooter, dressed in casual attire, good grooming and hygiene, in no apparent distress, calm and cooperative, good eye contact, no psychomotor agitation or retardation  Orientation: Alert and oriented to person, place and time  Recent and remote memory: Grossly intact  Attention span and concentration: Grossly intact  Speech: Spontaneous, normal rate, rhythm and tone  Thought Process: Linear, logical and goal directed  Thought Content: Denies suicidal or homicidal ideations, intent or plan  Perception: Denies auditory or visual hallucinations. No delusions noted  Associations: Intact  Language: Appropriate  Fund of knowledge and vocabulary: Grossly adequate  Mood: \"not good\"  Affect: Anxious at times, mood congruent  Insight: Good  Judgment: Good      Impression:  1. Benzodiazepine (Xanax) use disorder, moderate   2. Unspecified anxiety disorder    Medical Records/Labs/Diagnostic Tests Reviewed:  [facility] records - appropriate refills, no abuse suspected    Plan:  1. Continue Xanax to 0.5 mg 3 times daily. #90 tabs with 2 refills faxed to her pharmacy. She continues to have a lot of physical health problems and have again encouraged her to discuss this with her primary care physician or go to an ER as she complains of frequent chest pains, abdominal bloating and constipation. She is afraid to go to an ER for getting admitted as she fears that she will not get her Xanax the way she takes and she will end up having a seizure. I have reinforced that if she is admitted I will try and coordinate with admitting physician about her Xanax. I do not think that cutting back on her dose of Xanax will help her both physically or mentally. I again encouraged her to cut back on her dose if she can buy herself and I will continue to work with her to slowly taper her dose of Xanax    Return to " clinic in 3 months or sooner if symptoms worsen    The proposed treatment plan was discussed with the patient who was provided the opportunity to ask questions and make suggestions regarding alternative treatment. Patient verbalized understanding and expressed agreement with the plan.     Total face-to-face time: 35 minutes  More than 50% of face-to-face time was spent in counseling and coordinating care. Discussed benzodiazepine dependence and management.    Mague Calvin M.D.  10/11/17    This note was created using voice recognition software (Dragon). The accuracy of the dictation is limited by the abilities of the software. I have reviewed the note prior to signing, however some errors in grammar and context are still possible. If you have any questions related to this note please do not hesitate to contact our office.

## 2017-12-18 NOTE — TELEPHONE ENCOUNTER
Was the patient seen in the last year in this department? Yes     Does patient have an active prescription for medications requested? Yes     Received Request Via: Patient     FYI: Pt they normally p/u the script between the 10th or 11th of the month as of the 14th the pharmacy wouldn't allow her to p/u states it was too soon. Pt states last p/u was 11/14/17 according to the bottle. Pt is completely out.

## 2017-12-21 NOTE — PROGRESS NOTES
CC: COPD/HTN/Abodminal discomfort    HPI:   Emily person is a hypochondriac patient, came today to discuss the following medical issues:    Chronic obstructive pulmonary disease, unspecified COPD type (CMS-HCC)  Has been stable , however has been having exertional SOB.has been on oxygen 2L/min without a problem.Takes on Xopenex inhaler as needed.She stated that she has h/o pulmonary nodules and wants to know what happened to those nodule, and if they are still there. She is concerned about lung cancer.    Paroxysmal atrial fibrillation (CMS-HCC)  Has been asymptomatic.Has been on Cardizem.Wants a referral to cardiology because her cardiologist left the town.    Essential hypertension  BP has been adequately controlled on current medication. Denies headache, chest pain, and SOB.Has been on Cardizem, and clonidine.    Pacemaker  Has been asymptomatic most of the time. However feels electric shocks on and off.Wants a referral to cardiology because her cardiologist( Dr Marcum) left the town.    Abdominal discomfort  Has been having a chronic bloating and discomfort. She has been seeing GI, a lot of test was done to her all are fine,, she stated that she needs a second opinion.        Patient Active Problem List    Diagnosis Date Noted   • COPD (chronic obstructive pulmonary disease) (CMS-HCC) 11/03/2015     Priority: High   • Colitis 05/27/2015     Priority: High   • CAD (coronary artery disease), native coronary artery 02/16/2014     Priority: High   • Anxiety 12/31/2013     Priority: High   • Leukopenia 11/03/2015     Priority: Medium   • Impaired fasting glucose 08/05/2015     Priority: Medium   • Depressive disorder, not elsewhere classified 08/04/2015     Priority: Medium   • Constipation 05/11/2015     Priority: Medium   • Fatigue 03/10/2015     Priority: Medium   • HTN (hypertension) 10/24/2014     Priority: Medium   • Chest pain 10/20/2014     Priority: Medium   • Chronic abdominal pain 02/16/2014      Priority: Medium   • Normocytic anemia 01/18/2014     Priority: Medium   • Anemia 12/31/2013     Priority: Medium   • Paroxysmal atrial fibrillation (CMS-HCC) 12/31/2013     Priority: Medium   • Sleep apnea 12/25/2013     Priority: Medium   • Low vitamin B12 level 12/10/2015     Priority: Low   • Dermatophytosis, hand 12/10/2015     Priority: Low   • Nausea 03/10/2015     Priority: Low   • History of colonic polyps 07/01/2014     Priority: Low   • Obesity 02/16/2014     Priority: Low   • H/O vitamin D deficiency 02/16/2014     Priority: Low   • Anxiety disorder 10/11/2017   • Xanax use disorder, moderate, dependence (CMS-[facility]) 04/10/2017   • Multiple lung nodules on CT 08/04/2016   • Lung metastases (CMS-HCC) 08/04/2016       Current Outpatient Prescriptions   Medication Sig Dispense Refill   • alprazolam (XANAX) 0.5 MG Tab Take 1 Tab by mouth 3 times a day as needed for Anxiety. 90 Tab 2   • diltiazem CD (CARDIZEM CD) 240 MG CAPSULE SR 24 HR Take 1 Cap by mouth every evening. 90 Cap 3   • diltiazem CD (CARDIZEM CD) 120 MG CAPSULE SR 24 HR Take 1 Cap by mouth every day. Takes 120 mg  mg PM fill both 90 Cap 3   • ondansetron (ZOFRAN ODT) 4 MG TABLET DISPERSIBLE Take 1 Tab by mouth 4 times a day as needed for Nausea/Vomiting (for N/V). 60 Tab 5   • clotrimazole-betamethasone (LOTRISONE) 1-0.05 % Cream Apply 1 Application to affected area(s) 2 times a day. 45 g 3   • nitroglycerin (NITROSTAT) 0.4 MG SL Tab Place 0.4 mg under tongue every 5 minutes as needed for Chest Pain.     • clonidine (CATAPRES) 0.1 MG Tab Take 0.1 mg by mouth 4 times a day as needed. Every 4 hours as needed only if systolic is above 170 or diastolic 99 either   Indications: High Blood Pressure     • magnesium hydroxide (MILK OF MAGNESIA) 400 MG/5ML SUSP Take 30 mL by mouth 1 time daily as needed (Constipation).     • aspirin EC (ECOTRIN) 81 MG Tablet Delayed Response Take 81 mg by mouth every day.     • Lubiprostone (AMITIZA) 8 MCG Cap Take 1  "Cap by mouth 2 Times a Day. 60 Cap 6   • cyanocobalamin (VITAMIN B12) 500 MCG tablet Take 1 Tab by mouth every day. 30 Tab 1   • levalbuterol (XOPENEX HFA) 45 MCG/ACT inhaler Inhale 2 Puffs by mouth every four hours as needed for Shortness of Breath. 1 Inhaler 11   • esomeprazole (NEXIUM) 40 MG delayed-release capsule Take 1 Cap by mouth every day. 90 Cap 3     No current facility-administered medications for this visit.          Allergies as of 12/20/2017 - Reviewed 12/20/2017   Allergen Reaction Noted   • Codeine Rash and Vomiting 10/23/2014   • Dilaudid [hydromorphone hcl]  02/13/2017   • Epinephrine Palpitations 04/10/2017   • Erythromycin Diarrhea and Vomiting 04/10/2017   • Fentanyl  09/18/2017   • Heparin  10/23/2014   • Meperidine Rash and Vomiting 10/23/2014   • Milk products food allergy  10/30/2015   • Morphine Rash and Vomiting 10/23/2014   • Novocain  10/23/2014   • Other drug  03/21/2011   • Oxycodone  02/13/2017   • Sulfa drugs  10/23/2014   • Talwin  10/23/2014   • Tape Rash 11/03/2009   • Warfarin sodium  10/23/2014        ROS: Denies any chest pain, Shortness of breath, Changes bowel or bladder, Lower extremity edema.    Physical Exam:  /82   Pulse 95   Temp 36.1 °C (97 °F)   Resp 16   Ht 1.727 m (5' 8\")   Wt 103.4 kg (228 lb)   SpO2 90%   BMI 34.67 kg/m²   Gen.: Well-developed, well-nourished, no apparent distress,pleasant and cooperative with the examination  Skin:  Warm and dry with good turgor. No rashes or suspicious lesions in visible areas  HEENT:Sinuses nontender with palpation, TMs clear, nares patent with pink mucosa and clear rhinorrhea,no septal deviation ,polyps or lesions. lips without lesions, oropharynx clear.  Neck: Trachea midline,no masses or adenopathy. No JVD.  Cor: Regular rate and rhythm without murmur, gallop or rub.  Lungs: Respirations unlabored.Clear to auscultation with equal breath sounds bilaterally. No wheezes, rhonchi.  Extremities: No cyanosis, clubbing " or edema.      Assessment and Plan.   82 y.o. female     1. Chronic obstructive pulmonary disease, unspecified COPD type (CMS-HCC)  Stable   continue on oxygen 2L/min without a problem.Takes on Xopenex inhaler as needed.  Stated that she has h/o pulmonary nodules. Will check CXR.    - DX-CHEST-COMPLETE 4+; Future    2. Paroxysmal atrial fibrillation (CMS-[facility])  Stable.continue on Cardizem.  Will refer to cardiology for the second time( her cardiologist left the town).    - REFERRAL TO CARDIOLOGY    3. Essential hypertension  Has been adequately controlled on current medication. Denies headache, chest pain, and SOB.  Continue on current medication.    - REFERRAL TO CARDIOLOGY    4. Pacemaker  Has been adequately controlled on current medication. Denies headache, chest pain, and SOB.  Continue on current medication.    - REFERRAL TO CARDIOLOGY    5. Abdominal discomfort  Chronic.  Has been seeing GI, however she stated that she needs a second opinion.    - REFERRAL TO GASTROENTEROLOGY

## 2018-01-01 ENCOUNTER — APPOINTMENT (OUTPATIENT)
Dept: RADIOLOGY | Facility: MEDICAL CENTER | Age: 83
DRG: 180 | End: 2018-01-01
Attending: INTERNAL MEDICINE
Payer: MEDICARE

## 2018-01-01 ENCOUNTER — OFFICE VISIT (OUTPATIENT)
Dept: CARDIOLOGY | Facility: MEDICAL CENTER | Age: 83
End: 2018-01-01
Payer: MEDICARE

## 2018-01-01 ENCOUNTER — TELEPHONE (OUTPATIENT)
Dept: MEDICAL GROUP | Facility: CLINIC | Age: 83
End: 2018-01-01

## 2018-01-01 ENCOUNTER — HOME CARE VISIT (OUTPATIENT)
Dept: HOSPICE | Facility: HOSPICE | Age: 83
End: 2018-01-01
Payer: MEDICARE

## 2018-01-01 ENCOUNTER — APPOINTMENT (OUTPATIENT)
Dept: RADIOLOGY | Facility: MEDICAL CENTER | Age: 83
End: 2018-01-01
Attending: EMERGENCY MEDICINE
Payer: MEDICARE

## 2018-01-01 ENCOUNTER — HOSPITAL ENCOUNTER (OUTPATIENT)
Facility: MEDICAL CENTER | Age: 83
End: 2018-01-01
Attending: NEUROLOGICAL SURGERY | Admitting: NEUROLOGICAL SURGERY
Payer: MEDICARE

## 2018-01-01 ENCOUNTER — OFFICE VISIT (OUTPATIENT)
Dept: MEDICAL GROUP | Facility: CLINIC | Age: 83
End: 2018-01-01
Payer: MEDICARE

## 2018-01-01 ENCOUNTER — HOSPITAL ENCOUNTER (OUTPATIENT)
Dept: RADIOLOGY | Facility: MEDICAL CENTER | Age: 83
End: 2018-01-18
Attending: FAMILY MEDICINE
Payer: MEDICARE

## 2018-01-01 ENCOUNTER — PATIENT OUTREACH (OUTPATIENT)
Dept: OTHER | Facility: MEDICAL CENTER | Age: 83
End: 2018-01-01

## 2018-01-01 ENCOUNTER — HOSPITAL ENCOUNTER (OUTPATIENT)
Dept: LAB | Facility: MEDICAL CENTER | Age: 83
End: 2018-01-17
Attending: FAMILY MEDICINE
Payer: MEDICARE

## 2018-01-01 ENCOUNTER — TELEPHONE (OUTPATIENT)
Dept: MEDICAL GROUP | Facility: MEDICAL CENTER | Age: 83
End: 2018-01-01

## 2018-01-01 ENCOUNTER — HOSPITAL ENCOUNTER (OUTPATIENT)
Facility: MEDICAL CENTER | Age: 83
End: 2018-01-01
Attending: INTERNAL MEDICINE | Admitting: INTERNAL MEDICINE
Payer: MEDICARE

## 2018-01-01 ENCOUNTER — TELEPHONE (OUTPATIENT)
Dept: CARDIOLOGY | Facility: MEDICAL CENTER | Age: 83
End: 2018-01-01

## 2018-01-01 ENCOUNTER — TELEPHONE (OUTPATIENT)
Dept: HEMATOLOGY ONCOLOGY | Facility: MEDICAL CENTER | Age: 83
End: 2018-01-01

## 2018-01-01 ENCOUNTER — PATIENT OUTREACH (OUTPATIENT)
Dept: HEALTH INFORMATION MANAGEMENT | Facility: OTHER | Age: 83
End: 2018-01-01

## 2018-01-01 ENCOUNTER — OFFICE VISIT (OUTPATIENT)
Dept: HEMATOLOGY ONCOLOGY | Facility: MEDICAL CENTER | Age: 83
End: 2018-01-01
Payer: MEDICARE

## 2018-01-01 ENCOUNTER — HOSPITAL ENCOUNTER (INPATIENT)
Facility: MEDICAL CENTER | Age: 83
LOS: 3 days | DRG: 180 | End: 2018-04-12
Attending: EMERGENCY MEDICINE | Admitting: INTERNAL MEDICINE
Payer: MEDICARE

## 2018-01-01 ENCOUNTER — HOSPICE ADMISSION (OUTPATIENT)
Dept: HOSPICE | Facility: HOSPICE | Age: 83
End: 2018-01-01
Payer: MEDICARE

## 2018-01-01 ENCOUNTER — HOME HEALTH ADMISSION (OUTPATIENT)
Dept: HOME HEALTH SERVICES | Facility: HOME HEALTHCARE | Age: 83
End: 2018-01-01
Payer: MEDICARE

## 2018-01-01 ENCOUNTER — APPOINTMENT (OUTPATIENT)
Dept: BEHAVIORAL HEALTH | Facility: PHYSICIAN GROUP | Age: 83
End: 2018-01-01
Payer: MEDICARE

## 2018-01-01 ENCOUNTER — APPOINTMENT (OUTPATIENT)
Dept: MEDICAL GROUP | Facility: MEDICAL CENTER | Age: 83
End: 2018-01-01
Payer: MEDICARE

## 2018-01-01 ENCOUNTER — HOSPITAL ENCOUNTER (OUTPATIENT)
Dept: RADIOLOGY | Facility: MEDICAL CENTER | Age: 83
End: 2018-04-24
Attending: INTERNAL MEDICINE
Payer: MEDICARE

## 2018-01-01 ENCOUNTER — RESOLUTE PROFESSIONAL BILLING HOSPITAL PROF FEE (OUTPATIENT)
Dept: HOSPITALIST | Facility: MEDICAL CENTER | Age: 83
End: 2018-01-01
Payer: MEDICARE

## 2018-01-01 ENCOUNTER — HOME CARE VISIT (OUTPATIENT)
Dept: HOME HEALTH SERVICES | Facility: HOME HEALTHCARE | Age: 83
End: 2018-01-01

## 2018-01-01 ENCOUNTER — HOME CARE VISIT (OUTPATIENT)
Dept: HOME HEALTH SERVICES | Facility: HOME HEALTHCARE | Age: 83
End: 2018-01-01
Payer: MEDICARE

## 2018-01-01 ENCOUNTER — HOSPITAL ENCOUNTER (OUTPATIENT)
Dept: RADIOLOGY | Facility: MEDICAL CENTER | Age: 83
End: 2018-01-11
Attending: FAMILY MEDICINE
Payer: MEDICARE

## 2018-01-01 ENCOUNTER — HOSPITAL ENCOUNTER (EMERGENCY)
Facility: MEDICAL CENTER | Age: 83
End: 2018-05-07
Attending: EMERGENCY MEDICINE
Payer: MEDICARE

## 2018-01-01 ENCOUNTER — OFFICE VISIT (OUTPATIENT)
Dept: BEHAVIORAL HEALTH | Facility: PHYSICIAN GROUP | Age: 83
End: 2018-01-01
Payer: MEDICARE

## 2018-01-01 ENCOUNTER — PATIENT MESSAGE (OUTPATIENT)
Dept: HEALTH INFORMATION MANAGEMENT | Facility: OTHER | Age: 83
End: 2018-01-01

## 2018-01-01 ENCOUNTER — APPOINTMENT (OUTPATIENT)
Dept: RADIOLOGY | Facility: MEDICAL CENTER | Age: 83
DRG: 180 | End: 2018-01-01
Attending: EMERGENCY MEDICINE
Payer: MEDICARE

## 2018-01-01 VITALS
HEART RATE: 88 BPM | SYSTOLIC BLOOD PRESSURE: 131 MMHG | RESPIRATION RATE: 28 BRPM | DIASTOLIC BLOOD PRESSURE: 87 MMHG | OXYGEN SATURATION: 98 %

## 2018-01-01 VITALS
SYSTOLIC BLOOD PRESSURE: 143 MMHG | HEART RATE: 81 BPM | WEIGHT: 196.65 LBS | BODY MASS INDEX: 29.13 KG/M2 | RESPIRATION RATE: 21 BRPM | HEIGHT: 69 IN | TEMPERATURE: 97.6 F | OXYGEN SATURATION: 96 % | DIASTOLIC BLOOD PRESSURE: 79 MMHG

## 2018-01-01 VITALS
BODY MASS INDEX: 34.25 KG/M2 | OXYGEN SATURATION: 94 % | DIASTOLIC BLOOD PRESSURE: 70 MMHG | HEIGHT: 68 IN | RESPIRATION RATE: 18 BRPM | SYSTOLIC BLOOD PRESSURE: 130 MMHG | HEART RATE: 74 BPM | WEIGHT: 226 LBS

## 2018-01-01 VITALS
RESPIRATION RATE: 24 BRPM | OXYGEN SATURATION: 98 % | DIASTOLIC BLOOD PRESSURE: 85 MMHG | HEART RATE: 89 BPM | SYSTOLIC BLOOD PRESSURE: 141 MMHG

## 2018-01-01 VITALS
OXYGEN SATURATION: 97 % | RESPIRATION RATE: 20 BRPM | SYSTOLIC BLOOD PRESSURE: 118 MMHG | HEIGHT: 68 IN | DIASTOLIC BLOOD PRESSURE: 68 MMHG | HEART RATE: 101 BPM | TEMPERATURE: 97.6 F

## 2018-01-01 VITALS
RESPIRATION RATE: 24 BRPM | SYSTOLIC BLOOD PRESSURE: 134 MMHG | OXYGEN SATURATION: 98 % | DIASTOLIC BLOOD PRESSURE: 86 MMHG | HEART RATE: 76 BPM

## 2018-01-01 VITALS
RESPIRATION RATE: 18 BRPM | HEART RATE: 108 BPM | DIASTOLIC BLOOD PRESSURE: 78 MMHG | SYSTOLIC BLOOD PRESSURE: 144 MMHG | TEMPERATURE: 97.1 F | OXYGEN SATURATION: 96 %

## 2018-01-01 VITALS
BODY MASS INDEX: 32.65 KG/M2 | HEART RATE: 76 BPM | OXYGEN SATURATION: 91 % | HEIGHT: 65 IN | TEMPERATURE: 98.6 F | WEIGHT: 196 LBS | SYSTOLIC BLOOD PRESSURE: 134 MMHG | RESPIRATION RATE: 18 BRPM | DIASTOLIC BLOOD PRESSURE: 76 MMHG

## 2018-01-01 VITALS — BODY MASS INDEX: 29.7 KG/M2 | WEIGHT: 196 LBS | HEIGHT: 68 IN

## 2018-01-01 VITALS — HEIGHT: 68 IN | SYSTOLIC BLOOD PRESSURE: 154 MMHG | DIASTOLIC BLOOD PRESSURE: 98 MMHG | HEART RATE: 98 BPM

## 2018-01-01 DIAGNOSIS — J90 PLEURAL EFFUSION: ICD-10-CM

## 2018-01-01 DIAGNOSIS — G89.29 CHRONIC CHEST PAIN: ICD-10-CM

## 2018-01-01 DIAGNOSIS — C79.9 METASTATIC ADENOCARCINOMA (HCC): ICD-10-CM

## 2018-01-01 DIAGNOSIS — J44.9 CHRONIC OBSTRUCTIVE PULMONARY DISEASE, UNSPECIFIED COPD TYPE (HCC): ICD-10-CM

## 2018-01-01 DIAGNOSIS — C34.92 NON-SMALL CELL CANCER OF LEFT LUNG (HCC): ICD-10-CM

## 2018-01-01 DIAGNOSIS — F41.9 ANXIETY DISORDER, UNSPECIFIED TYPE: ICD-10-CM

## 2018-01-01 DIAGNOSIS — I10 ESSENTIAL HYPERTENSION: ICD-10-CM

## 2018-01-01 DIAGNOSIS — G47.30 SLEEP APNEA, UNSPECIFIED TYPE: ICD-10-CM

## 2018-01-01 DIAGNOSIS — R07.9 CHRONIC CHEST PAIN: ICD-10-CM

## 2018-01-01 DIAGNOSIS — R06.00 DYSPNEA, UNSPECIFIED TYPE: ICD-10-CM

## 2018-01-01 DIAGNOSIS — R06.03 RESPIRATORY DISTRESS: ICD-10-CM

## 2018-01-01 DIAGNOSIS — R07.9 CHEST PAIN, UNSPECIFIED TYPE: ICD-10-CM

## 2018-01-01 DIAGNOSIS — J43.9 PULMONARY EMPHYSEMA, UNSPECIFIED EMPHYSEMA TYPE (HCC): ICD-10-CM

## 2018-01-01 DIAGNOSIS — J96.11 CHRONIC RESPIRATORY FAILURE WITH HYPOXIA (HCC): ICD-10-CM

## 2018-01-01 DIAGNOSIS — F41.9 ANXIETY: ICD-10-CM

## 2018-01-01 DIAGNOSIS — F32.A DEPRESSIVE DISORDER: ICD-10-CM

## 2018-01-01 DIAGNOSIS — F13.20 XANAX USE DISORDER, MODERATE, DEPENDENCE (HCC): ICD-10-CM

## 2018-01-01 DIAGNOSIS — J96.21 ACUTE ON CHRONIC RESPIRATORY FAILURE WITH HYPOXIA (HCC): ICD-10-CM

## 2018-01-01 DIAGNOSIS — R91.1 LUNG NODULE: ICD-10-CM

## 2018-01-01 DIAGNOSIS — R91.8 LUNG MASS: ICD-10-CM

## 2018-01-01 DIAGNOSIS — Z09 HOSPITAL DISCHARGE FOLLOW-UP: ICD-10-CM

## 2018-01-01 DIAGNOSIS — R91.8 MULTIPLE LUNG NODULES ON CT: Primary | ICD-10-CM

## 2018-01-01 DIAGNOSIS — Z59.9 INADEQUATE COMMUNITY RESOURCES: ICD-10-CM

## 2018-01-01 DIAGNOSIS — J90 PLEURAL EFFUSION ON LEFT: ICD-10-CM

## 2018-01-01 DIAGNOSIS — R11.0 NAUSEA: ICD-10-CM

## 2018-01-01 DIAGNOSIS — I48.19 PERSISTENT ATRIAL FIBRILLATION (HCC): ICD-10-CM

## 2018-01-01 DIAGNOSIS — J90 PLEURAL EFFUSION, LEFT: ICD-10-CM

## 2018-01-01 DIAGNOSIS — I48.0 PAROXYSMAL ATRIAL FIBRILLATION (HCC): ICD-10-CM

## 2018-01-01 LAB
ALBUMIN SERPL BCP-MCNC: 3.2 G/DL (ref 3.2–4.9)
ALBUMIN SERPL BCP-MCNC: 3.5 G/DL (ref 3.2–4.9)
ALBUMIN SERPL BCP-MCNC: 3.8 G/DL (ref 3.2–4.9)
ALBUMIN SERPL BCP-MCNC: 3.8 G/DL (ref 3.2–4.9)
ALBUMIN/GLOB SERPL: 1.1 G/DL
ALBUMIN/GLOB SERPL: 1.1 G/DL
ALBUMIN/GLOB SERPL: 1.2 G/DL
ALP SERPL-CCNC: 66 U/L (ref 30–99)
ALP SERPL-CCNC: 78 U/L (ref 30–99)
ALP SERPL-CCNC: 95 U/L (ref 30–99)
ALT SERPL-CCNC: 11 U/L (ref 2–50)
ALT SERPL-CCNC: 6 U/L (ref 2–50)
ALT SERPL-CCNC: 6 U/L (ref 2–50)
ANION GAP SERPL CALC-SCNC: 5 MMOL/L (ref 0–11.9)
ANION GAP SERPL CALC-SCNC: 6 MMOL/L (ref 0–11.9)
ANION GAP SERPL CALC-SCNC: 6 MMOL/L (ref 0–11.9)
APPEARANCE FLD: NORMAL
APPEARANCE UR: ABNORMAL
APTT PPP: 33 SEC (ref 24.7–36)
APTT PPP: 33.7 SEC (ref 24.7–36)
AST SERPL-CCNC: 12 U/L (ref 12–45)
AST SERPL-CCNC: 14 U/L (ref 12–45)
AST SERPL-CCNC: 15 U/L (ref 12–45)
BACTERIA #/AREA URNS HPF: NEGATIVE /HPF
BACTERIA FLD AEROBE CULT: NORMAL
BACTERIA SPEC ANAEROBE CULT: NORMAL
BASOPHILS # BLD AUTO: 0.2 % (ref 0–1.8)
BASOPHILS # BLD AUTO: 0.3 % (ref 0–1.8)
BASOPHILS # BLD: 0.02 K/UL (ref 0–0.12)
BASOPHILS # BLD: 0.03 K/UL (ref 0–0.12)
BILIRUB SERPL-MCNC: 0.3 MG/DL (ref 0.1–1.5)
BILIRUB SERPL-MCNC: 0.4 MG/DL (ref 0.1–1.5)
BILIRUB SERPL-MCNC: 0.4 MG/DL (ref 0.1–1.5)
BILIRUB UR QL STRIP.AUTO: NEGATIVE
BLOOD CULTURE HOLD CXBCH: NORMAL
BNP SERPL-MCNC: 23 PG/ML (ref 0–100)
BODY FLD TYPE: NORMAL
BUN SERPL-MCNC: 12 MG/DL (ref 8–22)
BUN SERPL-MCNC: 14 MG/DL (ref 8–22)
BUN SERPL-MCNC: 15 MG/DL (ref 8–22)
CALCIUM SERPL-MCNC: 9.8 MG/DL (ref 8.5–10.5)
CALCIUM SERPL-MCNC: 9.8 MG/DL (ref 8.5–10.5)
CALCIUM SERPL-MCNC: 9.9 MG/DL (ref 8.5–10.5)
CALCIUM SERPL-MCNC: 9.9 MG/DL (ref 8.5–10.5)
CHLORIDE SERPL-SCNC: 101 MMOL/L (ref 96–112)
CHLORIDE SERPL-SCNC: 102 MMOL/L (ref 96–112)
CHLORIDE SERPL-SCNC: 102 MMOL/L (ref 96–112)
CHLORIDE SERPL-SCNC: 103 MMOL/L (ref 96–112)
CO2 SERPL-SCNC: 29 MMOL/L (ref 20–33)
CO2 SERPL-SCNC: 31 MMOL/L (ref 20–33)
CO2 SERPL-SCNC: 32 MMOL/L (ref 20–33)
CO2 SERPL-SCNC: 33 MMOL/L (ref 20–33)
COLOR FLD: YELLOW
COLOR UR: YELLOW
CREAT SERPL-MCNC: 0.6 MG/DL (ref 0.5–1.4)
CREAT SERPL-MCNC: 0.7 MG/DL (ref 0.5–1.4)
CREAT SERPL-MCNC: 0.75 MG/DL (ref 0.5–1.4)
CREAT SERPL-MCNC: 0.77 MG/DL (ref 0.5–1.4)
CREAT SERPL-MCNC: 0.8 MG/DL (ref 0.5–1.4)
CSF COMMENTS 1658: NORMAL
CYTOLOGY REG CYTOL: NORMAL
EKG IMPRESSION: NORMAL
EOSINOPHIL # BLD AUTO: 0.08 K/UL (ref 0–0.51)
EOSINOPHIL # BLD AUTO: 0.09 K/UL (ref 0–0.51)
EOSINOPHIL NFR BLD: 0.8 % (ref 0–6.9)
EOSINOPHIL NFR BLD: 1 % (ref 0–6.9)
EPI CELLS #/AREA URNS HPF: NEGATIVE /HPF
ERYTHROCYTE [DISTWIDTH] IN BLOOD BY AUTOMATED COUNT: 48.2 FL (ref 35.9–50)
ERYTHROCYTE [DISTWIDTH] IN BLOOD BY AUTOMATED COUNT: 48.7 FL (ref 35.9–50)
ERYTHROCYTE [DISTWIDTH] IN BLOOD BY AUTOMATED COUNT: 48.9 FL (ref 35.9–50)
GLOBULIN SER CALC-MCNC: 2.9 G/DL (ref 1.9–3.5)
GLOBULIN SER CALC-MCNC: 3.2 G/DL (ref 1.9–3.5)
GLOBULIN SER CALC-MCNC: 3.4 G/DL (ref 1.9–3.5)
GLUCOSE FLD-MCNC: 76 MG/DL
GLUCOSE SERPL-MCNC: 108 MG/DL (ref 65–99)
GLUCOSE SERPL-MCNC: 109 MG/DL (ref 65–99)
GLUCOSE SERPL-MCNC: 88 MG/DL (ref 65–99)
GLUCOSE SERPL-MCNC: 95 MG/DL (ref 65–99)
GLUCOSE UR STRIP.AUTO-MCNC: NEGATIVE MG/DL
GRAM STN SPEC: NORMAL
GRAM STN SPEC: NORMAL
HCT VFR BLD AUTO: 37.6 % (ref 37–47)
HCT VFR BLD AUTO: 40.1 % (ref 37–47)
HCT VFR BLD AUTO: 47.2 % (ref 37–47)
HGB BLD-MCNC: 11.8 G/DL (ref 12–16)
HGB BLD-MCNC: 12.5 G/DL (ref 12–16)
HGB BLD-MCNC: 14.8 G/DL (ref 12–16)
HISTIOCYTES NFR FLD: 1 %
HYALINE CASTS #/AREA URNS LPF: ABNORMAL /LPF
IMM GRANULOCYTES # BLD AUTO: 0.02 K/UL (ref 0–0.11)
IMM GRANULOCYTES # BLD AUTO: 0.03 K/UL (ref 0–0.11)
IMM GRANULOCYTES NFR BLD AUTO: 0.2 % (ref 0–0.9)
IMM GRANULOCYTES NFR BLD AUTO: 0.3 % (ref 0–0.9)
INR PPP: 1.07 (ref 0.87–1.13)
KETONES UR STRIP.AUTO-MCNC: ABNORMAL MG/DL
LDH FLD L TO P-CCNC: 390 U/L
LEUKOCYTE ESTERASE UR QL STRIP.AUTO: ABNORMAL
LIPASE SERPL-CCNC: 23 U/L (ref 11–82)
LYMPHOCYTES # BLD AUTO: 1.13 K/UL (ref 1–4.8)
LYMPHOCYTES # BLD AUTO: 1.48 K/UL (ref 1–4.8)
LYMPHOCYTES NFR BLD: 11.9 % (ref 22–41)
LYMPHOCYTES NFR BLD: 16.6 % (ref 22–41)
LYMPHOCYTES NFR FLD: 61 %
MCH RBC QN AUTO: 31.2 PG (ref 27–33)
MCH RBC QN AUTO: 31.5 PG (ref 27–33)
MCH RBC QN AUTO: 31.6 PG (ref 27–33)
MCHC RBC AUTO-ENTMCNC: 31.2 G/DL (ref 33.6–35)
MCHC RBC AUTO-ENTMCNC: 31.4 G/DL (ref 33.6–35)
MCHC RBC AUTO-ENTMCNC: 31.4 G/DL (ref 33.6–35)
MCV RBC AUTO: 100.8 FL (ref 81.4–97.8)
MCV RBC AUTO: 101 FL (ref 81.4–97.8)
MCV RBC AUTO: 99.4 FL (ref 81.4–97.8)
MESOTHL CELL NFR FLD: 18 %
MICRO URNS: ABNORMAL
MONOCYTES # BLD AUTO: 0.8 K/UL (ref 0–0.85)
MONOCYTES # BLD AUTO: 1.02 K/UL (ref 0–0.85)
MONOCYTES NFR BLD AUTO: 10.7 % (ref 0–13.4)
MONOCYTES NFR BLD AUTO: 9 % (ref 0–13.4)
MONONUC CELLS NFR FLD: 6 %
NEUTROPHILS # BLD AUTO: 6.51 K/UL (ref 2–7.15)
NEUTROPHILS # BLD AUTO: 7.22 K/UL (ref 2–7.15)
NEUTROPHILS NFR BLD: 73 % (ref 44–72)
NEUTROPHILS NFR BLD: 76 % (ref 44–72)
NEUTROPHILS NFR FLD: 12 %
NITRITE UR QL STRIP.AUTO: POSITIVE
NRBC # BLD AUTO: 0 K/UL
NRBC # BLD AUTO: 0 K/UL
NRBC BLD-RTO: 0 /100 WBC
NRBC BLD-RTO: 0 /100 WBC
PATH REV: NORMAL
PATH REV: NORMAL
PH FLD: 7 [PH]
PH UR STRIP.AUTO: 6.5 [PH]
PHOSPHATE SERPL-MCNC: 3.1 MG/DL (ref 2.5–4.5)
PLATELET # BLD AUTO: 211 K/UL (ref 164–446)
PLATELET # BLD AUTO: 219 K/UL (ref 164–446)
PLATELET # BLD AUTO: 277 K/UL (ref 164–446)
PMV BLD AUTO: 10.4 FL (ref 9–12.9)
PMV BLD AUTO: 10.4 FL (ref 9–12.9)
PMV BLD AUTO: 10.5 FL (ref 9–12.9)
POTASSIUM SERPL-SCNC: 4 MMOL/L (ref 3.6–5.5)
POTASSIUM SERPL-SCNC: 4.1 MMOL/L (ref 3.6–5.5)
POTASSIUM SERPL-SCNC: 4.1 MMOL/L (ref 3.6–5.5)
POTASSIUM SERPL-SCNC: 4.4 MMOL/L (ref 3.6–5.5)
PROCALCITONIN SERPL-MCNC: <0.05 NG/ML
PROT FLD-MCNC: 4 G/DL
PROT SERPL-MCNC: 6.1 G/DL (ref 6–8.2)
PROT SERPL-MCNC: 7 G/DL (ref 6–8.2)
PROT SERPL-MCNC: 7.2 G/DL (ref 6–8.2)
PROT UR QL STRIP: 100 MG/DL
PROTHROMBIN TIME: 13.6 SEC (ref 12–14.6)
RBC # BLD AUTO: 3.73 M/UL (ref 4.2–5.4)
RBC # BLD AUTO: 3.97 M/UL (ref 4.2–5.4)
RBC # BLD AUTO: 4.75 M/UL (ref 4.2–5.4)
RBC # FLD: 2000 CELLS/UL
RBC # URNS HPF: ABNORMAL /HPF
RBC UR QL AUTO: ABNORMAL
SIGNIFICANT IND 70042: NORMAL
SITE SITE: NORMAL
SODIUM SERPL-SCNC: 137 MMOL/L (ref 135–145)
SODIUM SERPL-SCNC: 139 MMOL/L (ref 135–145)
SODIUM SERPL-SCNC: 139 MMOL/L (ref 135–145)
SODIUM SERPL-SCNC: 141 MMOL/L (ref 135–145)
SOURCE SOURCE: NORMAL
SP GR UR STRIP.AUTO: 1.01
TROPONIN I SERPL-MCNC: <0.01 NG/ML (ref 0–0.04)
TROPONIN I SERPL-MCNC: <0.01 NG/ML (ref 0–0.04)
UROBILINOGEN UR STRIP.AUTO-MCNC: 0.2 MG/DL
WBC # BLD AUTO: 5.3 K/UL (ref 4.8–10.8)
WBC # BLD AUTO: 8.9 K/UL (ref 4.8–10.8)
WBC # BLD AUTO: 9.5 K/UL (ref 4.8–10.8)
WBC # FLD: 1231 CELLS/UL
WBC #/AREA URNS HPF: ABNORMAL /HPF
WBC OTHER NFR FLD: 2 %

## 2018-01-01 PROCEDURE — C1729 CATH, DRAINAGE: HCPCS

## 2018-01-01 PROCEDURE — S9126 HOSPICE CARE, IN THE HOME, P: HCPCS

## 2018-01-01 PROCEDURE — 99223 1ST HOSP IP/OBS HIGH 75: CPT | Performed by: INTERNAL MEDICINE

## 2018-01-01 PROCEDURE — 32555 ASPIRATE PLEURA W/ IMAGING: CPT | Mod: LT

## 2018-01-01 PROCEDURE — G8988 SELF CARE GOAL STATUS: HCPCS | Mod: CI

## 2018-01-01 PROCEDURE — A9270 NON-COVERED ITEM OR SERVICE: HCPCS | Performed by: INTERNAL MEDICINE

## 2018-01-01 PROCEDURE — 85027 COMPLETE CBC AUTOMATED: CPT

## 2018-01-01 PROCEDURE — G0156 HHCP-SVS OF AIDE,EA 15 MIN: HCPCS

## 2018-01-01 PROCEDURE — 93000 ELECTROCARDIOGRAM COMPLETE: CPT | Mod: 59 | Performed by: INTERNAL MEDICINE

## 2018-01-01 PROCEDURE — 99232 SBSQ HOSP IP/OBS MODERATE 35: CPT | Performed by: INTERNAL MEDICINE

## 2018-01-01 PROCEDURE — 88112 CYTOPATH CELL ENHANCE TECH: CPT

## 2018-01-01 PROCEDURE — 84520 ASSAY OF UREA NITROGEN: CPT

## 2018-01-01 PROCEDURE — 99495 TRANSJ CARE MGMT MOD F2F 14D: CPT | Performed by: FAMILY MEDICINE

## 2018-01-01 PROCEDURE — 770020 HCHG ROOM/CARE - TELE (206)

## 2018-01-01 PROCEDURE — 99214 OFFICE O/P EST MOD 30 MIN: CPT | Performed by: PSYCHIATRY & NEUROLOGY

## 2018-01-01 PROCEDURE — 700111 HCHG RX REV CODE 636 W/ 250 OVERRIDE (IP): Performed by: INTERNAL MEDICINE

## 2018-01-01 PROCEDURE — 99233 SBSQ HOSP IP/OBS HIGH 50: CPT | Performed by: INTERNAL MEDICINE

## 2018-01-01 PROCEDURE — 94660 CPAP INITIATION&MGMT: CPT

## 2018-01-01 PROCEDURE — G8978 MOBILITY CURRENT STATUS: HCPCS | Mod: CK

## 2018-01-01 PROCEDURE — 83880 ASSAY OF NATRIURETIC PEPTIDE: CPT

## 2018-01-01 PROCEDURE — 82565 ASSAY OF CREATININE: CPT

## 2018-01-01 PROCEDURE — 71046 X-RAY EXAM CHEST 2 VIEWS: CPT

## 2018-01-01 PROCEDURE — 6650321

## 2018-01-01 PROCEDURE — G0299 HHS/HOSPICE OF RN EA 15 MIN: HCPCS

## 2018-01-01 PROCEDURE — 700102 HCHG RX REV CODE 250 W/ 637 OVERRIDE(OP): Performed by: INTERNAL MEDICINE

## 2018-01-01 PROCEDURE — A4520 INCONTINENCE GARMENT ANYTYPE: HCPCS

## 2018-01-01 PROCEDURE — 71045 X-RAY EXAM CHEST 1 VIEW: CPT

## 2018-01-01 PROCEDURE — 88341 IMHCHEM/IMCYTCHM EA ADD ANTB: CPT | Mod: 91

## 2018-01-01 PROCEDURE — A4554 DISPOSABLE UNDERPADS: HCPCS

## 2018-01-01 PROCEDURE — 97166 OT EVAL MOD COMPLEX 45 MIN: CPT

## 2018-01-01 PROCEDURE — 85025 COMPLETE CBC W/AUTO DIFF WBC: CPT

## 2018-01-01 PROCEDURE — 36415 COLL VENOUS BLD VENIPUNCTURE: CPT

## 2018-01-01 PROCEDURE — 80053 COMPREHEN METABOLIC PANEL: CPT

## 2018-01-01 PROCEDURE — 84484 ASSAY OF TROPONIN QUANT: CPT

## 2018-01-01 PROCEDURE — 74177 CT ABD & PELVIS W/CONTRAST: CPT

## 2018-01-01 PROCEDURE — 99239 HOSP IP/OBS DSCHRG MGMT >30: CPT | Performed by: INTERNAL MEDICINE

## 2018-01-01 PROCEDURE — 99284 EMERGENCY DEPT VISIT MOD MDM: CPT

## 2018-01-01 PROCEDURE — 93010 ELECTROCARDIOGRAM REPORT: CPT | Performed by: INTERNAL MEDICINE

## 2018-01-01 PROCEDURE — G8987 SELF CARE CURRENT STATUS: HCPCS | Mod: CL

## 2018-01-01 PROCEDURE — 93005 ELECTROCARDIOGRAM TRACING: CPT | Performed by: EMERGENCY MEDICINE

## 2018-01-01 PROCEDURE — 85610 PROTHROMBIN TIME: CPT

## 2018-01-01 PROCEDURE — 700117 HCHG RX CONTRAST REV CODE 255: Performed by: INTERNAL MEDICINE

## 2018-01-01 PROCEDURE — 99285 EMERGENCY DEPT VISIT HI MDM: CPT

## 2018-01-01 PROCEDURE — 80500 HCHG CLINICAL PATH CONSULT-LIMITED: CPT

## 2018-01-01 PROCEDURE — 304561 HCHG STAT O2

## 2018-01-01 PROCEDURE — 83690 ASSAY OF LIPASE: CPT

## 2018-01-01 PROCEDURE — 0W9B3ZZ DRAINAGE OF LEFT PLEURAL CAVITY, PERCUTANEOUS APPROACH: ICD-10-PCS | Performed by: RADIOLOGY

## 2018-01-01 PROCEDURE — 87075 CULTR BACTERIA EXCEPT BLOOD: CPT

## 2018-01-01 PROCEDURE — 71260 CT THORAX DX C+: CPT

## 2018-01-01 PROCEDURE — 84157 ASSAY OF PROTEIN OTHER: CPT

## 2018-01-01 PROCEDURE — 51798 US URINE CAPACITY MEASURE: CPT

## 2018-01-01 PROCEDURE — 94760 N-INVAS EAR/PLS OXIMETRY 1: CPT

## 2018-01-01 PROCEDURE — G8979 MOBILITY GOAL STATUS: HCPCS | Mod: CI

## 2018-01-01 PROCEDURE — 89051 BODY FLUID CELL COUNT: CPT

## 2018-01-01 PROCEDURE — 81001 URINALYSIS AUTO W/SCOPE: CPT

## 2018-01-01 PROCEDURE — 88313 SPECIAL STAINS GROUP 2: CPT

## 2018-01-01 PROCEDURE — 88305 TISSUE EXAM BY PATHOLOGIST: CPT

## 2018-01-01 PROCEDURE — 85730 THROMBOPLASTIN TIME PARTIAL: CPT

## 2018-01-01 PROCEDURE — 87205 SMEAR GRAM STAIN: CPT

## 2018-01-01 PROCEDURE — 84145 PROCALCITONIN (PCT): CPT

## 2018-01-01 PROCEDURE — 0W9B3ZX DRAINAGE OF LEFT PLEURAL CAVITY, PERCUTANEOUS APPROACH, DIAGNOSTIC: ICD-10-PCS | Performed by: RADIOLOGY

## 2018-01-01 PROCEDURE — 83986 ASSAY PH BODY FLUID NOS: CPT

## 2018-01-01 PROCEDURE — 99204 OFFICE O/P NEW MOD 45 MIN: CPT | Mod: 25 | Performed by: INTERNAL MEDICINE

## 2018-01-01 PROCEDURE — G0155 HHCP-SVS OF CSW,EA 15 MIN: HCPCS

## 2018-01-01 PROCEDURE — 93005 ELECTROCARDIOGRAM TRACING: CPT

## 2018-01-01 PROCEDURE — 665036 HSPC NOTICE OF ELECTION NOE

## 2018-01-01 PROCEDURE — 83615 LACTATE (LD) (LDH) ENZYME: CPT

## 2018-01-01 PROCEDURE — 93005 ELECTROCARDIOGRAM TRACING: CPT | Performed by: INTERNAL MEDICINE

## 2018-01-01 PROCEDURE — 80069 RENAL FUNCTION PANEL: CPT

## 2018-01-01 PROCEDURE — 88342 IMHCHEM/IMCYTCHM 1ST ANTB: CPT

## 2018-01-01 PROCEDURE — 87015 SPECIMEN INFECT AGNT CONCNTJ: CPT

## 2018-01-01 PROCEDURE — 700117 HCHG RX CONTRAST REV CODE 255: Performed by: FAMILY MEDICINE

## 2018-01-01 PROCEDURE — 87070 CULTURE OTHR SPECIMN AEROBIC: CPT

## 2018-01-01 PROCEDURE — 99205 OFFICE O/P NEW HI 60 MIN: CPT | Performed by: INTERNAL MEDICINE

## 2018-01-01 PROCEDURE — 94002 VENT MGMT INPAT INIT DAY: CPT

## 2018-01-01 PROCEDURE — 97162 PT EVAL MOD COMPLEX 30 MIN: CPT

## 2018-01-01 PROCEDURE — 82945 GLUCOSE OTHER FLUID: CPT

## 2018-01-01 PROCEDURE — 93279 PRGRMG DEV EVAL PM/LDLS PM: CPT | Performed by: INTERNAL MEDICINE

## 2018-01-01 PROCEDURE — A4335 INCONTINENCE SUPPLY: HCPCS

## 2018-01-01 RX ORDER — ALPRAZOLAM 0.5 MG/1
0.5 TABLET ORAL EVERY 6 HOURS PRN
Qty: 30 TAB | Refills: 0 | Status: SHIPPED | OUTPATIENT
Start: 2018-01-01 | End: 2018-01-01

## 2018-01-01 RX ORDER — ONDANSETRON 4 MG/1
4 TABLET, ORALLY DISINTEGRATING ORAL EVERY 4 HOURS PRN
Status: DISCONTINUED | OUTPATIENT
Start: 2018-01-01 | End: 2018-01-01 | Stop reason: HOSPADM

## 2018-01-01 RX ORDER — ASPIRIN 325 MG
325 TABLET ORAL DAILY
Status: DISCONTINUED | OUTPATIENT
Start: 2018-01-01 | End: 2018-01-01 | Stop reason: HOSPADM

## 2018-01-01 RX ORDER — OMEPRAZOLE 20 MG/1
20 CAPSULE, DELAYED RELEASE ORAL DAILY
Status: DISCONTINUED | OUTPATIENT
Start: 2018-01-01 | End: 2018-01-01 | Stop reason: HOSPADM

## 2018-01-01 RX ORDER — ACETAMINOPHEN 325 MG/1
650 TABLET ORAL EVERY 4 HOURS PRN
Status: DISCONTINUED | OUTPATIENT
Start: 2018-01-01 | End: 2018-01-01

## 2018-01-01 RX ORDER — ASPIRIN 81 MG/1
324 TABLET, CHEWABLE ORAL DAILY
Status: DISCONTINUED | OUTPATIENT
Start: 2018-01-01 | End: 2018-01-01 | Stop reason: HOSPADM

## 2018-01-01 RX ORDER — ALPRAZOLAM 0.5 MG/1
0.5 TABLET ORAL EVERY 4 HOURS PRN
Status: DISCONTINUED | OUTPATIENT
Start: 2018-01-01 | End: 2018-01-01 | Stop reason: HOSPADM

## 2018-01-01 RX ORDER — CLOTRIMAZOLE AND BETAMETHASONE DIPROPIONATE 10; .64 MG/G; MG/G
1 CREAM TOPICAL 2 TIMES DAILY
COMMUNITY
End: 2018-01-01

## 2018-01-01 RX ORDER — ESOMEPRAZOLE MAGNESIUM 40 MG/1
40 CAPSULE, DELAYED RELEASE ORAL
COMMUNITY

## 2018-01-01 RX ORDER — ALPRAZOLAM 0.5 MG/1
0.5 TABLET ORAL EVERY 4 HOURS
Qty: 30 TAB | OUTPATIENT
Start: 2018-01-01

## 2018-01-01 RX ORDER — ALPRAZOLAM 0.5 MG/1
0.5 TABLET ORAL 3 TIMES DAILY PRN
Qty: 90 TAB | Refills: 2 | Status: SHIPPED
Start: 2018-01-01 | End: 2018-01-01

## 2018-01-01 RX ORDER — DILTIAZEM HYDROCHLORIDE 240 MG/1
240 CAPSULE, COATED, EXTENDED RELEASE ORAL 2 TIMES DAILY
Qty: 180 CAP | Refills: 0 | OUTPATIENT
Start: 2018-01-01

## 2018-01-01 RX ORDER — ACETAMINOPHEN 500 MG
1000 TABLET ORAL EVERY 8 HOURS PRN
COMMUNITY

## 2018-01-01 RX ORDER — ONDANSETRON 4 MG/1
4 TABLET, ORALLY DISINTEGRATING ORAL EVERY 8 HOURS PRN
OUTPATIENT
Start: 2018-01-01

## 2018-01-01 RX ORDER — CLONIDINE HYDROCHLORIDE 0.1 MG/1
0.1 TABLET ORAL TWICE DAILY
Status: DISCONTINUED | OUTPATIENT
Start: 2018-01-01 | End: 2018-01-01 | Stop reason: HOSPADM

## 2018-01-01 RX ORDER — ALPRAZOLAM 0.5 MG/1
0.5 TABLET ORAL 3 TIMES DAILY PRN
Qty: 90 TAB | Refills: 0 | Status: SHIPPED
Start: 2018-01-01 | End: 2018-05-13

## 2018-01-01 RX ORDER — CEFDINIR 300 MG/1
300 CAPSULE ORAL 2 TIMES DAILY
Qty: 12 CAP | Refills: 0 | Status: SHIPPED | OUTPATIENT
Start: 2018-01-01 | End: 2018-01-01

## 2018-01-01 RX ORDER — CLONIDINE HYDROCHLORIDE 0.1 MG/1
0.2 TABLET ORAL TWICE DAILY
Status: DISCONTINUED | OUTPATIENT
Start: 2018-01-01 | End: 2018-01-01

## 2018-01-01 RX ORDER — ALPRAZOLAM 0.5 MG/1
0.5 TABLET ORAL EVERY 4 HOURS
COMMUNITY
End: 2018-01-01 | Stop reason: SDUPTHER

## 2018-01-01 RX ORDER — ACETAMINOPHEN 500 MG
1000 TABLET ORAL EVERY 8 HOURS PRN
Status: DISCONTINUED | OUTPATIENT
Start: 2018-01-01 | End: 2018-01-01 | Stop reason: HOSPADM

## 2018-01-01 RX ORDER — ASPIRIN 325 MG
325 TABLET ORAL DAILY
Qty: 100 TAB | Refills: 2 | COMMUNITY
Start: 2018-01-01 | End: 2018-01-01

## 2018-01-01 RX ORDER — AMOXICILLIN 250 MG
2 CAPSULE ORAL 2 TIMES DAILY
Status: DISCONTINUED | OUTPATIENT
Start: 2018-01-01 | End: 2018-01-01 | Stop reason: HOSPADM

## 2018-01-01 RX ORDER — ASPIRIN 300 MG/1
300 SUPPOSITORY RECTAL DAILY
Status: DISCONTINUED | OUTPATIENT
Start: 2018-01-01 | End: 2018-01-01 | Stop reason: HOSPADM

## 2018-01-01 RX ORDER — ALPRAZOLAM 0.5 MG/1
0.5 TABLET ORAL EVERY 4 HOURS
Status: DISCONTINUED | OUTPATIENT
Start: 2018-01-01 | End: 2018-01-01

## 2018-01-01 RX ORDER — DILTIAZEM HYDROCHLORIDE 240 MG/1
240 CAPSULE, COATED, EXTENDED RELEASE ORAL 2 TIMES DAILY
COMMUNITY
End: 2018-01-01 | Stop reason: SDUPTHER

## 2018-01-01 RX ORDER — LEVALBUTEROL TARTRATE 45 UG/1
2 AEROSOL, METERED ORAL EVERY 4 HOURS PRN
Status: DISCONTINUED | OUTPATIENT
Start: 2018-01-01 | End: 2018-01-01

## 2018-01-01 RX ORDER — DILTIAZEM HYDROCHLORIDE 240 MG/1
240 CAPSULE, COATED, EXTENDED RELEASE ORAL 2 TIMES DAILY
Status: DISCONTINUED | OUTPATIENT
Start: 2018-01-01 | End: 2018-01-01 | Stop reason: HOSPADM

## 2018-01-01 RX ORDER — BISACODYL 10 MG
10 SUPPOSITORY, RECTAL RECTAL
Status: DISCONTINUED | OUTPATIENT
Start: 2018-01-01 | End: 2018-01-01 | Stop reason: HOSPADM

## 2018-01-01 RX ORDER — ONDANSETRON 4 MG/1
4 TABLET, ORALLY DISINTEGRATING ORAL 4 TIMES DAILY PRN
Qty: 60 TAB | Refills: 0 | OUTPATIENT
Start: 2018-01-01 | End: 2018-01-01

## 2018-01-01 RX ORDER — CEFDINIR 300 MG/1
300 CAPSULE ORAL ONCE
Status: COMPLETED | OUTPATIENT
Start: 2018-01-01 | End: 2018-01-01

## 2018-01-01 RX ORDER — ONDANSETRON 4 MG/1
4 TABLET, ORALLY DISINTEGRATING ORAL EVERY 4 HOURS PRN
COMMUNITY
End: 2018-01-01

## 2018-01-01 RX ORDER — POLYETHYLENE GLYCOL 3350 17 G/17G
1 POWDER, FOR SOLUTION ORAL
Status: DISCONTINUED | OUTPATIENT
Start: 2018-01-01 | End: 2018-01-01 | Stop reason: HOSPADM

## 2018-01-01 RX ORDER — ONDANSETRON 2 MG/ML
4 INJECTION INTRAMUSCULAR; INTRAVENOUS EVERY 4 HOURS PRN
Status: DISCONTINUED | OUTPATIENT
Start: 2018-01-01 | End: 2018-01-01 | Stop reason: HOSPADM

## 2018-01-01 RX ORDER — LEVALBUTEROL INHALATION SOLUTION 1.25 MG/3ML
1.25 SOLUTION RESPIRATORY (INHALATION)
Status: DISCONTINUED | OUTPATIENT
Start: 2018-01-01 | End: 2018-01-01 | Stop reason: HOSPADM

## 2018-01-01 RX ADMIN — DILTIAZEM HYDROCHLORIDE 240 MG: 240 CAPSULE, COATED, EXTENDED RELEASE ORAL at 20:15

## 2018-01-01 RX ADMIN — ACETAMINOPHEN 650 MG: 325 TABLET, FILM COATED ORAL at 09:06

## 2018-01-01 RX ADMIN — IOHEXOL 100 ML: 350 INJECTION, SOLUTION INTRAVENOUS at 13:05

## 2018-01-01 RX ADMIN — ACETAMINOPHEN 1000 MG: 500 TABLET ORAL at 14:52

## 2018-01-01 RX ADMIN — ONDANSETRON 4 MG: 4 TABLET, ORALLY DISINTEGRATING ORAL at 19:53

## 2018-01-01 RX ADMIN — ALPRAZOLAM 0.5 MG: 0.5 TABLET ORAL at 19:47

## 2018-01-01 RX ADMIN — ACETAMINOPHEN 650 MG: 325 TABLET, FILM COATED ORAL at 18:29

## 2018-01-01 RX ADMIN — ALPRAZOLAM 0.5 MG: 0.5 TABLET ORAL at 07:35

## 2018-01-01 RX ADMIN — ONDANSETRON 4 MG: 4 TABLET, ORALLY DISINTEGRATING ORAL at 16:11

## 2018-01-01 RX ADMIN — DILTIAZEM HYDROCHLORIDE 240 MG: 240 CAPSULE, COATED, EXTENDED RELEASE ORAL at 07:43

## 2018-01-01 RX ADMIN — ACETAMINOPHEN 1000 MG: 500 TABLET ORAL at 16:03

## 2018-01-01 RX ADMIN — DILTIAZEM HYDROCHLORIDE 240 MG: 240 CAPSULE, COATED, EXTENDED RELEASE ORAL at 08:48

## 2018-01-01 RX ADMIN — ALPRAZOLAM 0.5 MG: 0.5 TABLET ORAL at 09:41

## 2018-01-01 RX ADMIN — DILTIAZEM HYDROCHLORIDE 240 MG: 240 CAPSULE, COATED, EXTENDED RELEASE ORAL at 22:19

## 2018-01-01 RX ADMIN — ALPRAZOLAM 0.5 MG: 0.5 TABLET ORAL at 16:11

## 2018-01-01 RX ADMIN — ALPRAZOLAM 0.5 MG: 0.5 TABLET ORAL at 15:10

## 2018-01-01 RX ADMIN — ACETAMINOPHEN 650 MG: 325 TABLET, FILM COATED ORAL at 03:13

## 2018-01-01 RX ADMIN — ONDANSETRON 4 MG: 4 TABLET, ORALLY DISINTEGRATING ORAL at 02:11

## 2018-01-01 RX ADMIN — CEFDINIR 300 MG: 300 CAPSULE ORAL at 16:11

## 2018-01-01 RX ADMIN — ACETAMINOPHEN 1000 MG: 500 TABLET ORAL at 09:47

## 2018-01-01 RX ADMIN — DILTIAZEM HYDROCHLORIDE 240 MG: 240 CAPSULE, COATED, EXTENDED RELEASE ORAL at 09:40

## 2018-01-01 RX ADMIN — ACETAMINOPHEN 1000 MG: 500 TABLET ORAL at 07:35

## 2018-01-01 RX ADMIN — LORAZEPAM 1 MG: 2 CONCENTRATE ORAL at 12:30

## 2018-01-01 RX ADMIN — ONDANSETRON 4 MG: 4 TABLET, ORALLY DISINTEGRATING ORAL at 18:21

## 2018-01-01 RX ADMIN — ALPRAZOLAM 0.5 MG: 0.5 TABLET ORAL at 14:52

## 2018-01-01 RX ADMIN — DILTIAZEM HYDROCHLORIDE 240 MG: 240 CAPSULE, COATED, EXTENDED RELEASE ORAL at 19:47

## 2018-01-01 RX ADMIN — MORPHINE SULFATE 0.5 MG: 10 SOLUTION ORAL at 12:00

## 2018-01-01 RX ADMIN — ALPRAZOLAM 0.5 MG: 0.5 TABLET ORAL at 02:10

## 2018-01-01 RX ADMIN — ALPRAZOLAM 0.5 MG: 0.5 TABLET ORAL at 03:13

## 2018-01-01 RX ADMIN — ALPRAZOLAM 0.5 MG: 0.5 TABLET ORAL at 19:50

## 2018-01-01 RX ADMIN — ACETAMINOPHEN 1000 MG: 500 TABLET ORAL at 23:12

## 2018-01-01 RX ADMIN — IOHEXOL 80 ML: 350 INJECTION, SOLUTION INTRAVENOUS at 18:19

## 2018-01-01 RX ADMIN — ALPRAZOLAM 0.5 MG: 0.5 TABLET ORAL at 20:15

## 2018-01-01 RX ADMIN — ALPRAZOLAM 0.5 MG: 0.5 TABLET ORAL at 09:07

## 2018-01-01 RX ADMIN — ACETAMINOPHEN 1000 MG: 500 TABLET ORAL at 02:10

## 2018-01-01 RX ADMIN — ONDANSETRON 4 MG: 4 TABLET, ORALLY DISINTEGRATING ORAL at 12:28

## 2018-01-01 SDOH — ECONOMIC STABILITY: HOUSING INSECURITY
HOME SAFETY: WINDOW OF HOME  NEED FOR A  FUNCTIONAL FIRE EXTINGUISHER AND SMOKE DETECTORS IN HOME  KEEP LIQUIDS THAT MAY CATCH FIRE AWAY FROM YOUR OXYGEN. THIS INCLUDES CLEANING PRODUCTS THAT CONTAIN OIL, GREASE, ALCOHOL, OR OTHER LIQUIDS THAT CAN BURN.  KEEP OXY

## 2018-01-01 SDOH — ECONOMIC STABILITY: HOUSING INSECURITY
HOME SAFETY: REVIEWED MOVING ALL TRIPPING HAZARDS IN ROOM AND BATHROOM.  ALSO ASKED FOR O2 SIGN TO BE POSTED.OXYGEN RISK ASSESSMENT COMPLETED.  THE FOLLOWING OXYGEN SAFETY EDUCATION WAS PROVIDED:  NO OPEN FLAMES  POSTED  'OXYGEN IN USE' SIGN ON EXTERNAL DOOR  OR

## 2018-01-01 SDOH — ECONOMIC STABILITY: HOUSING INSECURITY: UNSAFE COOKING RANGE AREA: 0

## 2018-01-01 SDOH — ECONOMIC STABILITY: GENERAL

## 2018-01-01 SDOH — ECONOMIC STABILITY: HOUSING INSECURITY: UNSAFE APPLIANCES: 0

## 2018-01-01 SDOH — ECONOMIC STABILITY - INCOME SECURITY: PROBLEM RELATED TO HOUSING AND ECONOMIC CIRCUMSTANCES, UNSPECIFIED: Z59.9

## 2018-01-01 SDOH — ECONOMIC STABILITY: HOUSING INSECURITY
HOME SAFETY: GEN AWAY FROM ANY OPEN FLAMES OR BASEBOARD HEATING SYSTEMS  DO NOT USE VASELINE OR OTHER PETROLEUM-BASED CREAMS AND LOTIONS ON YOUR FACE OR UPPER PART OF YOUR BODY  AVOID TRIPPING OVER OXYGEN TUBING

## 2018-01-01 ASSESSMENT — GAIT ASSESSMENTS
ASSISTIVE DEVICE: FRONT WHEEL WALKER
GAIT LEVEL OF ASSIST: CONTACT GUARD ASSIST
DISTANCE (FEET): 3

## 2018-01-01 ASSESSMENT — ENCOUNTER SYMPTOMS
SEIZURES: 1
LAST BOWEL MOVEMENT: 64769
FATIGUES EASILY: 1
PHOTOPHOBIA: 0
SHORTNESS OF BREATH: 1
STOOL FREQUENCY: LESS THAN DAILY
DEPRESSION: 0
HEADACHES: 0
FOCAL WEAKNESS: 0
BACK PAIN: 0
FEVER: 0
BACK PAIN: 0
HALLUCINATIONS: 0
WEAKNESS: 1
COUGH: 0
SHORTNESS OF BREATH: 1
NAUSEA: 1
COUGH: 1
SHORTNESS OF BREATH: 1
DEPRESSION: 0
HEARTBURN: 0
DIZZINESS: 0
MYALGIAS: 0
DIARRHEA: 0
NERVOUS/ANXIOUS: 1
BLURRED VISION: 0
HALLUCINATIONS: 0
DYSPNEA ON EXERTION: 1
HEARTBURN: 0
CLAUDICATION: 0
SORE THROAT: 0
FEVER: 0
INSOMNIA: 0
CONSTIPATION: 1
SHORTNESS OF BREATH: 1
CONSTIPATION: 1
DYSPNEA ACTIVITY LEVEL: WHILE SPEAKING
HEADACHES: 0
NAUSEA: 0
DIZZINESS: 0
CHILLS: 0
STOOL FREQUENCY: LESS THAN DAILY
SHORTNESS OF BREATH: 1
DESCRIPTION OF MEMORY LOSS: SHORT TERM
MYALGIAS: 0
DIARRHEA: 0
SHORTNESS OF BREATH: 1
ABDOMINAL PAIN: 0
LAST BOWEL MOVEMENT: 64761
CHILLS: 0
SOMNOLENCE: 1
FATIGUE: 1
BLOOD IN STOOL: 0
MYALGIAS: 0
COUGH: 1
HEARTBURN: 0
SHORTNESS OF BREATH: 1
NERVOUS/ANXIOUS: 0
DIARRHEA: 0
BOWEL INCONTINENCE: 1
DYSPNEA ACTIVITY LEVEL: AT REST
DIZZINESS: 0
SPEECH CHANGE: 0
LAST BOWEL MOVEMENT: 64761
CONSTIPATION: 1
FATIGUE: 1
DYSPNEA ON EXERTION: 1
FEVER: 0
DEPRESSION: 0
DYSPNEA ACTIVITY LEVEL: WHILE SPEAKING
ABDOMINAL PAIN: 0
PALPITATIONS: 0
FATIGUES EASILY: 1
WHEEZING: 0
FATIGUES EASILY: 1
ABDOMINAL PAIN: 1
FALLS: 0
VOMITING: 0
PALPITATIONS: 0
NAUSEA: 0
INDIGESTION: 1
SORE THROAT: 0
WEAKNESS: 0
BLOOD IN STOOL: 0
WEAKNESS: 0
FOCAL WEAKNESS: 0
CHILLS: 0
HEADACHES: 0
DYSPNEA ON EXERTION: 1
VOMITING: 1
SENSORY CHANGE: 0
STOOL FREQUENCY: LESS THAN DAILY
SLEEP QUALITY: POOR
ABDOMINAL PAIN: 0
SLEEP QUALITY: POOR
VOMITING: 0
HEMOPTYSIS: 0
CONSTIPATION: 1

## 2018-01-01 ASSESSMENT — PATIENT HEALTH QUESTIONNAIRE - PHQ9
5. POOR APPETITE OR OVEREATING: SEVERAL DAYS
7. TROUBLE CONCENTRATING ON THINGS, SUCH AS READING THE NEWSPAPER OR WATCHING TELEVISION: NOT AT ALL
2. FEELING DOWN, DEPRESSED, IRRITABLE, OR HOPELESS: MORE THAN HALF THE DAYS
SUM OF ALL RESPONSES TO PHQ QUESTIONS 1-9: 7
1. LITTLE INTEREST OR PLEASURE IN DOING THINGS: SEVERAL DAYS
6. FEELING BAD ABOUT YOURSELF - OR THAT YOU ARE A FAILURE OR HAVE LET YOURSELF OR YOUR FAMILY DOWN: MORE THAN HALF THE DAYS
3. TROUBLE FALLING OR STAYING ASLEEP OR SLEEPING TOO MUCH: NOT AT ALL
9. THOUGHTS THAT YOU WOULD BE BETTER OFF DEAD, OR OF HURTING YOURSELF: NOT AT ALL
SUM OF ALL RESPONSES TO PHQ9 QUESTIONS 1 AND 2: 3
4. FEELING TIRED OR HAVING LITTLE ENERGY: SEVERAL DAYS
8. MOVING OR SPEAKING SO SLOWLY THAT OTHER PEOPLE COULD HAVE NOTICED. OR THE OPPOSITE, BEING SO FIGETY OR RESTLESS THAT YOU HAVE BEEN MOVING AROUND A LOT MORE THAN USUAL: NOT AT ALL

## 2018-01-01 ASSESSMENT — COGNITIVE AND FUNCTIONAL STATUS - GENERAL
WALKING IN HOSPITAL ROOM: A LOT
MOVING TO AND FROM BED TO CHAIR: A LOT
EATING MEALS: A LITTLE
HELP NEEDED FOR BATHING: A LOT
STANDING UP FROM CHAIR USING ARMS: A LOT
MOVING FROM LYING ON BACK TO SITTING ON SIDE OF FLAT BED: A LOT
SUGGESTED CMS G CODE MODIFIER MOBILITY: CL
TURNING FROM BACK TO SIDE WHILE IN FLAT BAD: A LOT
DAILY ACTIVITIY SCORE: 11
MOBILITY SCORE: 12
PERSONAL GROOMING: A LOT
CLIMB 3 TO 5 STEPS WITH RAILING: A LOT
SUGGESTED CMS G CODE MODIFIER DAILY ACTIVITY: CL
TOILETING: TOTAL
DRESSING REGULAR LOWER BODY CLOTHING: TOTAL
DRESSING REGULAR UPPER BODY CLOTHING: A LOT

## 2018-01-01 ASSESSMENT — PULMONARY FUNCTION TESTS: EPAP_CMH2O: 12

## 2018-01-01 ASSESSMENT — PAIN SCALES - GENERAL
PAINLEVEL_OUTOF10: 5
PAINLEVEL_OUTOF10: 6
PAINLEVEL_OUTOF10: 0
PAINLEVEL_OUTOF10: 3
PAINLEVEL_OUTOF10: 6
PAINLEVEL_OUTOF10: 0
PAINLEVEL_OUTOF10: 0
PAINLEVEL_OUTOF10: 5
PAINLEVEL_OUTOF10: 5
PAINLEVEL_OUTOF10: 0
PAINLEVEL_OUTOF10: 6
PAINLEVEL_OUTOF10: 4
PAINLEVEL_OUTOF10: 5
PAINLEVEL_OUTOF10: 8
PAINLEVEL_OUTOF10: 4
PAINLEVEL_OUTOF10: 3
PAINLEVEL_OUTOF10: 0
PAINLEVEL_OUTOF10: 2
PAINLEVEL: 10=SEVERE PAIN
PAINLEVEL_OUTOF10: 5
PAINLEVEL_OUTOF10: 5
PAINLEVEL_OUTOF10: 0
PAINLEVEL_OUTOF10: 5
PAINLEVEL_OUTOF10: 0
PAINLEVEL_OUTOF10: 2
PAINLEVEL_OUTOF10: 9
PAINLEVEL_OUTOF10: 0
PAINLEVEL_OUTOF10: 4
PAINLEVEL_OUTOF10: 7
PAINLEVEL_OUTOF10: 3

## 2018-01-01 ASSESSMENT — LIFESTYLE VARIABLES
EVER_SMOKED: YES
ALCOHOL_USE: NO

## 2018-01-01 ASSESSMENT — ACTIVITIES OF DAILY LIVING (ADL)
MONEY MANAGEMENT (EXPENSES/BILLS): NEEDS ASSISTANCE
MONEY MANAGEMENT (EXPENSES/BILLS): NEEDS ASSISTANCE
TOILETING: OTHER (COMMENTS)
MONEY MANAGEMENT (EXPENSES/BILLS): NEEDS ASSISTANCE

## 2018-01-01 ASSESSMENT — SOCIAL DETERMINANTS OF HEALTH (SDOH)
ACTIVE STRESSOR - EXPRESSED EMOTIONAL NEED: 1
ACTIVE STRESSOR - LOSS OF CONTROL: 1
ACTIVE STRESSOR - LACK OF CAREGIVERS: 1
ACTIVE STRESSOR - LOSS OF CONTROL: 1
EXPRESSED_FINANCIAL_NEED: 1
ACTIVE STRESSOR - EXHAUSTION: 1
ACTIVE STRESSOR - EXPRESSED EMOTIONAL NEED: 1
ACTIVE STRESSOR - EXPRESSED EMOTIONAL NEED: 1
ACTIVE STRESSOR - LOSS OF CONTROL: 1
ACTIVE STRESSOR - EXHAUSTION: 1
ACTIVE STRESSOR - HEALTH CHANGES: 1

## 2018-01-01 ASSESSMENT — PAIN SCALES - WONG BAKER: WONGBAKER_NUMERICALRESPONSE: DOESN'T HURT AT ALL

## 2018-01-11 NOTE — PROGRESS NOTES
"PSYCHIATRY FOLLOW-UP NOTE      Chief Complaint   Patient presents with   • Anxiety   • Depression         History Of Present Illness:  Emily Hopson is a 82 y.o. old female with CAD, atrial fibrillation, s/p pacemaker placement, HTN, dyslipidemia, COPD on supplemental oxygen, sleep apnea on CPAP comes in today for follow up, was last seen 3 months ago. She reports not doing good since her last visit here. She states that she's been in the bed for about 3 weeks now as she has been having cough and \"choking episodes\". She recently saw her PCP and a chest x-ray was ordered that she has not been able to get her imaging done. She states that she is in \"A. fib all the time\" and the only thing that helps her Xanax. She stated that \"the only things that are helping me right now are Xanax, Tylenol and my hot had\". She states that she was told that Xanax helps her with coronary spasms and it relieves her chest pains and palpitations. She was unable to get her Xanax due to some issues with the pharmacy and she states that she was having seizures but she did not seek any emergent help for it. She has been feeling depressed because of her physical health problems. She is no longer going to the casino once a week. She really enjoys that she is physically unable to do it. Denies any recent balance problems of falls. She has really good support from her partner who has been doing all the household chores. She has some passive thoughts of death because of how she feels physically but denies active thoughts, intent or plan of wanting to hurt herself or others.    Social History:    and  ×1. She was  to her first  for 31 years and has a 55-year-old son from that marriage. She is currently in a relationship for the 23+ years and lives with her partner in [place].    Substance Use:  Denies recent use of alcohol, nicotine or illicit drugs    Past Medication Trials:  None     Medications:  Current " Outpatient Prescriptions   Medication Sig Dispense Refill   • [START ON 1/15/2018] alprazolam (XANAX) 0.5 MG Tab Take 1 Tab by mouth 3 times a day as needed for Anxiety for up to 90 days. 90 Tab 2   • diltiazem CD (CARDIZEM CD) 240 MG CAPSULE SR 24 HR Take 1 Cap by mouth every evening. 90 Cap 3   • aspirin EC (ECOTRIN) 81 MG Tablet Delayed Response Take 81 mg by mouth every day.     • Lubiprostone (AMITIZA) 8 MCG Cap Take 1 Cap by mouth 2 Times a Day. 60 Cap 6   • diltiazem CD (CARDIZEM CD) 120 MG CAPSULE SR 24 HR Take 1 Cap by mouth every day. Takes 120 mg  mg PM fill both 90 Cap 3   • ondansetron (ZOFRAN ODT) 4 MG TABLET DISPERSIBLE Take 1 Tab by mouth 4 times a day as needed for Nausea/Vomiting (for N/V). 60 Tab 5   • clotrimazole-betamethasone (LOTRISONE) 1-0.05 % Cream Apply 1 Application to affected area(s) 2 times a day. 45 g 3   • cyanocobalamin (VITAMIN B12) 500 MCG tablet Take 1 Tab by mouth every day. 30 Tab 1   • nitroglycerin (NITROSTAT) 0.4 MG SL Tab Place 0.4 mg under tongue every 5 minutes as needed for Chest Pain.     • clonidine (CATAPRES) 0.1 MG Tab Take 0.1 mg by mouth 4 times a day as needed. Every 4 hours as needed only if systolic is above 170 or diastolic 99 either   Indications: High Blood Pressure     • levalbuterol (XOPENEX HFA) 45 MCG/ACT inhaler Inhale 2 Puffs by mouth every four hours as needed for Shortness of Breath. 1 Inhaler 11   • esomeprazole (NEXIUM) 40 MG delayed-release capsule Take 1 Cap by mouth every day. 90 Cap 3   • magnesium hydroxide (MILK OF MAGNESIA) 400 MG/5ML SUSP Take 30 mL by mouth 1 time daily as needed (Constipation).       No current facility-administered medications for this visit.        Review Of Systems:    Constitutional - Positive for fatigue, decreased appetite, weight loss  HEENT - Positive for b/l hearing loss  Respiratory - Positive for shortness of breath, cough  CVS - Positive for occasional palpitations and chest pain  GI - Negative for  "nausea, vomiting, diarrhea, constipation. Positive for abdominal bloating  Musculoskeletal - Positive for back pain  Neurological - Positive for headaches  Psychiatric - Positive for anxiety, depression, poor sleep    Physical Examination:  Vital signs: /98   Pulse 98   Ht 1.727 m (5' 8\")     Musculoskeletal: Slow gait, ambulating with an electric scooter. No abnormal movements.     Mental Status Evaluation:   General: Elderly obese white female, ambulating with an electric scooter, in mild respiratory distress, dressed in casual attire, good grooming and hygiene, in no apparent distress, calm and cooperative, good eye contact, no psychomotor agitation or retardation  Orientation: Alert and oriented to person, place and time  Recent and remote memory: Grossly intact  Attention span and concentration: Grossly intact  Speech: Spontaneous, normal rate, rhythm and tone  Thought Process: Linear, logical and goal directed  Thought Content: Denies suicidal or homicidal ideations, intent or plan  Perception: Denies auditory or visual hallucinations. No delusions noted  Associations: Intact  Language: Appropriate  Fund of knowledge and vocabulary: Grossly adequate  Mood: \"not good\"  Affect: Anxious and dysphoric, mood congruent  Insight: Good  Judgment: Good      Impression:  1. Benzodiazepine (Xanax) use disorder, moderate   2. Unspecified anxiety disorder  3. Unspecified depressive disorder    Medical Records/Labs/Diagnostic Tests Reviewed:  [place]  records - appropriate refills, no abuse suspected    Plan:  1. Continue Xanax to 0.5 mg 3 times daily. # 90 tabs with 2 refills faxed to her pharmacy. Continues to have several different physical health problems and I have encouraged her to either talk to her PCP to consider going to ER if she is having emergent problems. I again discussed with her side effects from Xanax use especially given her age, chronic respiratory illness, sleep apnea etc. She does understand the " risk of respiratory depression, falls and even death and is willing to take those risks because of the benefit she gets from Xanax.   2. Continues to decline SSRI/SNRI medication    Return to clinic in 3 months or sooner if symptoms worsen    The proposed treatment plan was discussed with the patient who was provided the opportunity to ask questions and make suggestions regarding alternative treatment. Patient verbalized understanding and expressed agreement with the plan.     Mague Calvin M.D.  01/11/18    This note was created using voice recognition software (Dragon). The accuracy of the dictation is limited by the abilities of the software. I have reviewed the note prior to signing, however some errors in grammar and context are still possible. If you have any questions related to this note please do not hesitate to contact our office.

## 2018-01-25 NOTE — PROGRESS NOTES
Arrhythmia Clinic Note (New patient)     DOS: 1/25/2017    Referring physician: Maggy Reaves    Chief complaint/Reason for consult: AF    HPI:  Patient is an 81 yo F with history of CAD s/p prior PCI, COPD, persistent atrial fibrillation s/p [facility] PPM. She normally follows with Dr. Marcum whom has recently left the area. She presents to establish care for her AF. She has long standing episodic CP as well. She is mostly concerned regarding a CT chest she had recently showing concerning spiculated masses. She is scheduled for biopsy. She otherwise is not on OAC. Had tried Eliquis before and tells me this is one she is willing to try again but she has nausea with this and would like some zofran to combat this when she first starts the medication.    ROS (+ highlighted in red):  Constitutional: Fevers/chills/fatigue/weightloss  HEENT: Blurry vision/eye pain/sore throat/hearing loss  Respiratory: Shortness of breath/cough  Cardiovascular: Chest pain/palpitations/edema/orthopnea/syncope  GI: Nausea/vomitting/diarrhea  MSK: Arthralgias/myagias/muscle weakness  Skin: Rash/sores  Neurological: Numbness/tremors/vertigo  Endocrine: Excessive thirst/polyuria/cold intolerance/heat intolerance  Psych: Depression/anxiety    Past Medical History:   Diagnosis Date   • Anemia    • Anesthesia     Doesn't breathe after general anesthesia   • Angina    • Atrial fibrillation (CMS-HCC)    • Bilateral cataracts         • Breath shortness    • Bronchitis    • CAD (coronary artery disease)    • CATARACT    • COPD    • COPD exacerbation (CMS-HCC) 12/25/2013   • Dental disorder     Broken teeth   • H/O vitamin D deficiency 2/16/2014   • Heart burn    • Hiatus hernia syndrome    • Hypercholesteremia    • Hypertension    • MI (myocardial infarction)     Due to angiospasm   • 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 at [facility]   • COLONOSCOPY  7/23/2014    Performed by Brett Estevez D.O. 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     Social History Main Topics   • Smoking status: Former Smoker     Packs/day: 1.00     Years: 52.00     Types: Cigarettes     Start date: 1/17/1945     Quit date: 12/20/2013   • Smokeless tobacco: Never Used      Comment: 1 pk a day for 40 yrs   • Alcohol use No   • Drug use: No   • Sexual activity: Not Currently     Other Topics Concern   • Not on file     Social History Narrative   • No narrative on file       Family History   Problem Relation Age of Onset   • Hypertension Mother    • Breast Cancer Mother    •  "Heart Disease Mother    • Dementia Father    • Other Son      Epilepsy       Allergies   Allergen Reactions   • Codeine Rash and Vomiting     Pt sates rash and vomiting.   • Dilaudid [Hydromorphone Hcl]    • Epinephrine Palpitations     Per pt, allergic to epinephrine in local anesthetic    • Erythromycin Diarrhea and Vomiting   • Fentanyl    • Heparin      Per historical: thrombocytopenia.   • Meperidine Rash and Vomiting     Okay with Promethazine.   • Milk Products Food Allergy      Sensitivity to it. Causes bloating.    • Morphine Rash and Vomiting   • Novocain      With epinephrine, causes patient to go into A-Fib.   • Other Drug      States allergic to most local anesthetics with epi   • Oxycodone    • Sulfa Drugs      Pt unsure of reaction.   • Talwin      \"Makes me go bezerk.\"   • Tape Rash     Pt states rash.   • Warfarin Sodium      Due to also taking Nexium, pt's INR was 21.       Current Outpatient Prescriptions   Medication Sig Dispense Refill   • Acetaminophen (TYLENOL ARTHRITIS EXT RELIEF PO) Take 1 Tab by mouth as needed.     • alprazolam (XANAX) 0.5 MG Tab Take 1 Tab by mouth 3 times a day as needed for Anxiety for up to 90 days. 90 Tab 2   • diltiazem CD (CARDIZEM CD) 240 MG CAPSULE SR 24 HR Take 1 Cap by mouth every evening. 90 Cap 3   • diltiazem CD (CARDIZEM CD) 120 MG CAPSULE SR 24 HR Take 1 Cap by mouth every day. Takes 120 mg  mg PM fill both 90 Cap 3   • ondansetron (ZOFRAN ODT) 4 MG TABLET DISPERSIBLE Take 1 Tab by mouth 4 times a day as needed for Nausea/Vomiting (for N/V). 60 Tab 5   • clotrimazole-betamethasone (LOTRISONE) 1-0.05 % Cream Apply 1 Application to affected area(s) 2 times a day. 45 g 3   • nitroglycerin (NITROSTAT) 0.4 MG SL Tab Place 0.4 mg under tongue every 5 minutes as needed for Chest Pain.     • clonidine (CATAPRES) 0.1 MG Tab Take 0.1 mg by mouth 4 times a day as needed. Every 4 hours as needed only if systolic is above 170 or diastolic 99 either   Indications: " "High Blood Pressure     • levalbuterol (XOPENEX HFA) 45 MCG/ACT inhaler Inhale 2 Puffs by mouth every four hours as needed for Shortness of Breath. 1 Inhaler 11   • esomeprazole (NEXIUM) 40 MG delayed-release capsule Take 1 Cap by mouth every day. 90 Cap 3   • magnesium hydroxide (MILK OF MAGNESIA) 400 MG/5ML SUSP Take 30 mL by mouth as needed (Constipation).     • aspirin EC (ECOTRIN) 81 MG Tablet Delayed Response Take 81 mg by mouth every day.     • Lubiprostone (AMITIZA) 8 MCG Cap Take 1 Cap by mouth 2 Times a Day. (Patient not taking: Reported on 1/25/2018) 60 Cap 6   • cyanocobalamin (VITAMIN B12) 500 MCG tablet Take 1 Tab by mouth every day. 30 Tab 1     No current facility-administered medications for this visit.        Physical Exam:  Vitals:    01/25/18 1350   BP: 130/70   Pulse: 74   Resp: 18   SpO2: 94%   Weight: 102.5 kg (226 lb)   Height: 1.727 m (5' 8\")     General appearance: NAD, conversant   Eyes: anicteric sclerae, moist conjunctivae; no lid-lag; PERRLA  HENT: Atraumatic; oropharynx clear with moist mucous membranes and no mucosal ulcerations; normal hard and soft palate  Neck: Trachea midline; FROM, supple, no thyromegaly or lymphadenopathy  Lungs: CTA, with normal respiratory effort and no intercostal retractions  CV: Irregular, no MRGs, no JVD   Abdomen: Soft, non-tender; no masses or HSM  Extremities: No peripheral edema or extremity lymphadenopathy  Skin: Normal temperature, turgor and texture; no rash, ulcers or subcutaneous nodules  Psych: Appropriate affect, alert and oriented to person, place and time    Data:  Labs reviewed    Prior echo/stress results reviewed:  2014 stress here negative for ischemia  Echo here in 2014 showing normal LV function    EKG interpreted by me:  AF, some conducted, some V paced    Impression/Plan:  1. Multiple lung nodules on CT     2. Chronic obstructive pulmonary disease, unspecified COPD type (CMS-HCC)     3. Persistent atrial fibrillation (CMS-HCC)     4. " Anxiety disorder, unspecified type     5. Xanax use disorder, moderate, dependence (CMS-HCC)       -Device interrogation reviewed, working appropriately  -She has high CHADSVasc score with long standing persistent AF, and ideally would be anticoagulated  -She does not want to try xarelto and pradaxa specifically and wants to try Eliquis again  -I think this is reasonable, but told her to start after her biopsy  -She has long standing CP and coronary disease, prior 2014 nuc negative for ischemia      Mohan Vides MD

## 2018-01-25 NOTE — TELEPHONE ENCOUNTER
Per Dr. Vides: ok to RX Zofran 4mg QID prn #60. Future refills to be obtained from GI.   Called pt. To advise. RX called to [facility].

## 2018-01-25 NOTE — TELEPHONE ENCOUNTER
"----- Message from Latonya Edgar sent at 1/25/2018  2:34 PM PST -----  Regarding: pharmacy doesn't have her Zofran and she needs it asap  SS/Alondra      Patient is in the parking lot of the pharmacy now. She said pharmacy doesn't have her Zofran prescription. She said she needs it as soon as possible, she is \"terribly ill\". She would like a call back at [phone]  "

## 2018-04-09 PROBLEM — R32 URINARY INCONTINENCE: Status: ACTIVE | Noted: 2018-01-01

## 2018-04-09 PROBLEM — J90 PLEURAL EFFUSION: Status: ACTIVE | Noted: 2018-01-01

## 2018-04-09 NOTE — ED NOTES
Med rec updated and complete.  Allergies reviewed.  Met with pt at bedside and dicussed current medications and  Last doses taken. Pt has her prescriptions at bedside.  Pt currently not taking her prescriptions as prescribed .  Alprazolam pt takes every 4 hours scheduled.  Diltiazem she is taking bid to help control her blood pressure.  Pt stated she also takes sugar packets to control her blood pressure.

## 2018-04-09 NOTE — ED NOTES
Chief Complaint   Patient presents with   • Chest Pain   • Shortness of Breath     Pt bib ems with above complaints. Per report pt been having symptoms since last thanks giving, her  finally convinced her to come today.   Pt uses 6L nc , speaking full sentences. She also c/o dizziness and nauseated.   ekg done

## 2018-04-10 PROBLEM — J96.21 ACUTE ON CHRONIC RESPIRATORY FAILURE WITH HYPOXIA (HCC): Status: ACTIVE | Noted: 2018-01-01

## 2018-04-10 NOTE — ASSESSMENT & PLAN NOTE
Very likely due to left-sided effusion. Intermittent pain in chest with trouble breathing  Significant left pleural effusion on CXR  Trend troponin - currently normal  Tele monitoring

## 2018-04-10 NOTE — PROGRESS NOTES
[facility] Hospitalist Progress Note    Date of Service: 4/10/2018    Chief Complaint  82 y.o. female with a history of chronic oxygen dependent COPD, paroxysmal atrial fibrillation not on anticoagulation, GERD, hypertension admitted 2018 with increased shortness of breath, found to have a large left-sided pleural effusion.    Interval Problem Update  4/10: BNP is normal therefore unlikely related heart failure, she has no evidence of infection. Highly suspicious for malignancy due to a history of pulmonary nodules on the left.  1100 mL of fluid were removed via thoracentesis. She is currently refusing biopsy of pulmonary nodules    Consultants/Specialty  IR    Disposition  F/U fluid cytology  Recommended biopsy of pulmonary nodules, patient declines at this time stating she wants a PET scan first.        Review of Systems   Constitutional: Negative for chills, fever and malaise/fatigue.   HENT: Negative for sore throat.    Respiratory: Positive for cough and shortness of breath.    Cardiovascular: Negative for chest pain and palpitations.   Gastrointestinal: Positive for constipation. Negative for abdominal pain, blood in stool, diarrhea, heartburn, nausea and vomiting.   Genitourinary: Negative for dysuria and frequency.   Musculoskeletal: Negative for back pain and myalgias.   Neurological: Negative for dizziness, focal weakness, weakness and headaches.   Psychiatric/Behavioral: Negative for depression and hallucinations.   All other systems reviewed and are negative.     Physical Exam  Laboratory/Imaging   Hemodynamics  Temp (24hrs), Av.3 °C (97.3 °F), Min:36.1 °C (96.9 °F), Max:36.9 °C (98.5 °F)   Temperature: 36.3 °C (97.3 °F)  Pulse  Av.9  Min: 61  Max: 98    Blood Pressure : 130/69      Respiratory      Respiration: 16, Pulse Oximetry: 94 %, O2 Daily Delivery Respiratory : Silicone Nasal Cannula        RUL Breath Sounds: Clear, RML Breath Sounds: Diminished, RLL Breath Sounds: Diminished, MARIBEL Breath  Sounds: Clear, LLL Breath Sounds: Diminished    Fluids    Intake/Output Summary (Last 24 hours) at 04/10/18 1659  Last data filed at 04/10/18 1600   Gross per 24 hour   Intake              360 ml   Output                0 ml   Net              360 ml       Nutrition  Orders Placed This Encounter   Procedures   • DIET ORDER     Standing Status:   Standing     Number of Occurrences:   1     Order Specific Question:   Diet:     Answer:   2 Gram Sodium [7]     Order Specific Question:   Diet:     Answer:   Cardiac [6]     Physical Exam   Constitutional: She is oriented to person, place, and time. She appears well-developed and well-nourished. She appears distressed.   HENT:   Head: Normocephalic.   Mouth/Throat: No oropharyngeal exudate.   Eyes: Pupils are equal, round, and reactive to light. No scleral icterus.   Neck: Normal range of motion. No JVD present. No thyromegaly present.   Cardiovascular:   No murmur heard.  Tachycardic   Pulmonary/Chest: She has no wheezes. She has no rales. She exhibits no tenderness.   Tachypnea, she has decreased breath sounds throughout on the left   Abdominal: Soft. Bowel sounds are normal. She exhibits no distension. There is no tenderness. There is no rebound.   Musculoskeletal: Normal range of motion. She exhibits no edema or tenderness.   Neurological: She is alert and oriented to person, place, and time. No cranial nerve deficit. Coordination normal.   Skin: No rash noted. No erythema.   Psychiatric:   Very anxious, tangential       Recent Labs      04/09/18   1255  04/10/18   0220   WBC  8.9  5.3   RBC  4.75  3.73*   HEMOGLOBIN  14.8  11.8*   HEMATOCRIT  47.2*  37.6   MCV  99.4*  100.8*   MCH  31.2  31.6   MCHC  31.4*  31.4*   RDW  48.7  48.2   PLATELETCT  277  211   MPV  10.4  10.5     Recent Labs      04/09/18   1632  04/09/18   1928  04/10/18   0220   SODIUM  137  141  139   POTASSIUM  4.1  4.0  4.1   CHLORIDE  102  102  103   CO2  29  33  31   GLUCOSE  88  109*  95   BUN  12   12  12   CREATININE  0.77  0.75  0.60   CALCIUM  9.9  9.9  9.8     Recent Labs      04/09/18   1414  04/09/18   1928   APTT  33.7  33.0   INR  1.07   --      Recent Labs      04/09/18   1255   BNPBTYPENAT  23              Assessment/Plan     * Acute on chronic respiratory failure with hypoxia (CMS-HCC)   Assessment & Plan    At baseline she wears 2-6 L of oxygen depending on anxiety levels. She has been wearing CPAP 24 hours a day as she states she feels better with this.  -Wean from CPAP as able   -Suspect her acute failure on chronic O2 requirements is related to large left pleural effusion which is highly suggestive of malignancy.  -Wean O2 following thoracentesis        Pleural effusion   Assessment & Plan    Nodularity along the pleura are highly suspicious for neoplasm. She has no evidence of clinical pneumonia and therefore I doubt parapneumonic.   Thoracentesis showed RBCs, no evidence of empyema.  Neutral pH, culture and cytology are pending  F/U fluid culture and cytology          COPD (chronic obstructive pulmonary disease) (CMS-HCC)- (present on admission)   Assessment & Plan    No acute exacerbation  RT protocol  Resume home meds  Chronically on CPAP 24h/day          CAD (coronary artery disease), native coronary artery- (present on admission)   Assessment & Plan    Resume home medications  Much of patient's medical care has been at [place]          Anxiety- (present on admission)   Assessment & Plan    Xanax dependent for >20 years  Home dose re-ordered 0.5mg po q 4h prn.        Chest pain- (present on admission)   Assessment & Plan    Intermittent pain in chest with trouble breathing  Significant left pleural effusion on CXR  Trend troponin - currently normal  Tele monitoring          Chronic abdominal pain- (present on admission)   Assessment & Plan    States worse with PO intake.  Abdominal CT shows no evidence of acute pathology. It did confirm a loculated left-sided effusion with nodularity of  "the pleura concerning for malignancy.  States is not eating but has not lost any weight.  States chronic constipation, unchanged from baseline  -Continue bowel regimen          Paroxysmal atrial fibrillation (CMS-HCC)- (present on admission)   Assessment & Plan    S/p pacer placement  Not on AC, she states she has a heparin and Lovenox allergy.  Rate controlled, continue diltiazem 240 mg twice a day          Macrocytic anemia- (present on admission)   Assessment & Plan    Check iron studies, B12, folate  -Repeat in the morning        Sleep apnea- (present on admission)   Assessment & Plan    Patient wears CPAP 24h/day.  I suspect this is partially due to anxiety which is diseased by positive pressure.  -Continue 6 L O2 versus CPAP for comfort.          Urinary incontinence   Assessment & Plan    Has been for past 1-2 weeks, wearing diaper  States she can only urinate in supine position  Bladder scan        Multiple lung nodules on CT- (present on admission)   Assessment & Plan    She states this has been present for the last 16 months. She has been recommended a biopsy of these lesions in the past however, she states she \"would like a PET scan 1st\" he is very resistant, it seems to going forward with further evaluation, although she says she would like to, sheath was noted to each that it is offered.  -Follow-up pleural fluid cytology as it may help confirm diagnosis of malignancy  -I have recommended a biopsy of the lung lesions, she is currently declining stating she would like a PET scan 1st  -Consider discharge in a.m., to arrange follow up outpatient with oncology if cytology is positive.        Nausea- (present on admission)   Assessment & Plan    Chronic issue  Anti-emetics prn        Obesity- (present on admission)   Assessment & Plan    Morbid obesity          Quality-Core Measures   Reviewed items::  Labs reviewed, Medications reviewed and Radiology images reviewed  Ro catheter::  No Ro  DVT " prophylaxis - mechanical:  SCDs

## 2018-04-10 NOTE — ASSESSMENT & PLAN NOTE
"She states this has been present for the last 16 months. She has been recommended a biopsy of these lesions in the past however, she states she \"would like a PET scan 1st\" he is very resistant, it seems to going forward with further evaluation, although she says she would like to, sheath was noted to each that it is offered.  -Follow-up pleural fluid cytology as it may help confirm diagnosis of malignancy  -I have recommended a biopsy of the lung lesions, she is currently declining stating she would like a PET scan 1st  -Consider discharge in a.m., to arrange follow up outpatient with oncology if cytology is positive.  "

## 2018-04-10 NOTE — DISCHARGE PLANNING
Anticipated Discharge Disposition: Unknown discharge disposition.    Action: LSW met with pt bedside about dc needs. Pt concerned about level of care being provided at [facility]. Pt would like PET scan done and multiple other tests before discharge. LSW provided pt with emotional support. Imm copy given. Signed copy placed in medical records basket.     Barriers to Discharge: Pt would like tests done that have not been done.    Plan: LSW to f/u with medical team on dc plan.

## 2018-04-10 NOTE — PROGRESS NOTES
Bedside report completed, assumed pt care.  Pt resting in bed, A&Ox4, anxious.  Reports of chest pressure, declines intervention, MD aware.  Discussed POC with pt.  NPO for thoracentesis.  Call light within reach, bed alarm on.

## 2018-04-10 NOTE — ASSESSMENT & PLAN NOTE
Nodularity along the pleura are highly suspicious for neoplasm. She has no evidence of clinical pneumonia and therefore I doubt parapneumonic.   Thoracentesis showed RBCs, no evidence of empyema.  Neutral pH, culture and cytology are pending  F/U fluid culture and cytology  Results of imaging were discussed with the patient  Consult placed to oncology nurse navigator in order to establish care for outpatient follow-up including PET scan, staging, biopsy when indicated

## 2018-04-10 NOTE — ASSESSMENT & PLAN NOTE
Has been for past 1-2 weeks, wearing diaper  States she can only urinate in supine position  Bladder scan

## 2018-04-10 NOTE — PROGRESS NOTES
Patient is worried that  is unable to spend the night and that she is unable to keep her home medications at bedside. Was told in ER that  would be allowed to stay.  is at bedside discussing options. Day RN discussed with charge RN and there were no private room options available then and are still not.

## 2018-04-10 NOTE — PROGRESS NOTES
Pt resting in bed, A&Ox4, anxious.  Reports of chest pressure, declines intevention.  Discussed POC with pt.  Call light within reach, bed alarm on.

## 2018-04-10 NOTE — ASSESSMENT & PLAN NOTE
S/p pacer placement  Not on AC, she states she has a heparin and Lovenox allergy.  Rate controlled, continue diltiazem 240 mg twice a day

## 2018-04-10 NOTE — ASSESSMENT & PLAN NOTE
States worse with PO intake.  Abdominal CT shows no evidence of acute pathology. It did confirm a loculated left-sided effusion with nodularity of the pleura concerning for malignancy.  States is not eating but has not lost any weight.  States chronic constipation, unchanged from baseline  -Continue bowel regimen  -Palliative care consult

## 2018-04-10 NOTE — OR SURGEON
Immediate Post- Operative Note        PostOp Diagnosis: pleural effusion      Procedure(s): thoracentesis      Estimated Blood Loss: Less than 5 ml        Complications: None            4/10/2018     10:56 AM     Charly Blanca

## 2018-04-10 NOTE — PROGRESS NOTES
PROCEDURE: LT thoracentesis  SONOGRAPHER: Sonia HEWITT  RADIOLOGIST: Dr. Blanca    PT upright for LT thoracentesis. PT's vitals were monitored and charted in epic. 1100 mL was removed from LT chest and 1000 mL was sent to lab. PT tolerated procedure well. Chest xray was ordered and PT was discharged in stable condition.

## 2018-04-10 NOTE — ASSESSMENT & PLAN NOTE
Patient wears CPAP 24h/day.  I suspect this is partially due to anxiety which is diseased by positive pressure.  -Continue 6 L O2 versus CPAP for comfort.

## 2018-04-10 NOTE — ASSESSMENT & PLAN NOTE
At baseline she wears 2-6 L of oxygen depending on anxiety levels. She has been wearing CPAP 24 hours a day as she states she feels better with this, but he confirmed that she actually was doing well on 4 L nasal cannula without CPAP  -Wean from CPAP as patient however she is refusing to have it weaned  -Suspect her acute failure on chronic O2 requirements is related to large left pleural effusion which is highly suggestive of malignancy.  -Wean O2 following thoracentesis

## 2018-04-10 NOTE — H&P
" Hospital Medicine History and Physical    Date of Service  4/9/2018    Chief Complaint  Chief Complaint   Patient presents with   • Chest Pain   • Shortness of Breath       History of Presenting Illness  82 y.o. female who presented 4/9/2018 with primary complaints of failure to thrive.  She hasn't been out of bed since January, is on CPAP 24 hours/day and isn't eating much as she states it causes significant abdominal pain which she has been dealing with for years.  She hasn't lost significant weight however.  She is incontinent of urine and wears a brief and states she cannot urinate in a seated position.  She states she is chronically constipated but had a regular bowel movement yesterday.  She denies fever and presented to ED on the urging of her family as \"they don't want her to die.\"  She also states she is very resistant to go to the hospital as she won't get her xanax and when her PCP urged her to lengthen the time between doses she started having \"seizures\" that she describes and uncontrolled shaking of her limbs but remains conscious.  CXR does show a large pleural effusion of the left chest that could certainly explain her new chest pressure and shortness of breath.  She has received much of her care at [place] but hasn't been back in over 1 year due to feeling too sick.      Primary Care Physician  Pcp Pt States None    Consultants  none    Code Status  full    Review of Systems  Review of Systems   Constitutional: Positive for malaise/fatigue. Negative for chills and fever.   HENT: Negative for congestion.    Eyes: Negative for blurred vision and photophobia.   Respiratory: Positive for shortness of breath. Negative for cough, hemoptysis and wheezing.    Cardiovascular: Positive for chest pain. Negative for claudication and leg swelling.   Gastrointestinal: Positive for constipation, nausea and vomiting. Negative for abdominal pain, diarrhea and heartburn.   Genitourinary: Negative for dysuria and " hematuria.        Urinary incontinence     Musculoskeletal: Negative for falls, joint pain and myalgias.   Skin: Negative for itching and rash.   Neurological: Positive for seizures (patient describes shaking of limbs) and weakness. Negative for dizziness, sensory change, speech change and headaches.   Psychiatric/Behavioral: Negative for depression. The patient is not nervous/anxious and does not have insomnia.         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   • COPD exacerbation (CMS-HCC) 12/25/2013   • Unspecified hemorrhagic conditions 1974    History of GI bleed   • Anemia    • Anesthesia     Doesn't breathe after general anesthesia   • Angina    • Atrial fibrillation (CMS-HCC)    • Bilateral cataracts         • Breath shortness    • Bronchitis    • CAD (coronary artery disease)    • CATARACT    • COPD    • Dental disorder     Broken teeth   • Heart burn    • Hiatus hernia syndrome    • Hypercholesteremia    • Hypertension    • MI (myocardial infarction)     Due to angiospasm   • Personal history of venous thrombosis and embolism     Leg   • Pneumonia    • Psychiatric disorder     anxiety   • Seizure (CMS-HCC)     • Sleep apnea     Uses O2 at night and prn, also cpap   • Snoring    • Supplemental oxygen dependent     3-4 l in day, 6 l @ noc   • Ulcer of the stomach and intestine    • Unspecified disorder of thyroid     Thyroiditis       Surgical History  Past Surgical History:   Procedure Laterality Date   • GASTROSCOPY  7/23/2014    Performed by Brett Estevez D.O. at SURGERY SAME DAY [facility] ORS   • COLONOSCOPY  7/23/2014    Performed by Brett Estevez D.O. at SURGERY SAME DAY [facility] ORS   • CATARACT PHACO WITH IOL  4/13/2011    Performed by QUOC GUNDERSON at SURGERY SAME DAY [facility] ORS   • CATARACT PHACO WITH IOL  3/23/2011    Performed by QUOC GUNDERSON at SURGERY  SAME DAY [facility] ORS   • BLEPHAROPLASTY  9/7/2010    Performed by AJ SUAREZ at SURGERY SAME DAY [facility] ORS   • BLEPHAROPLASTY  11/3/2009    Performed by AJ SUAREZ at SURGERY SAME DAY [facility] ORS   • BLEPHAROPLASTY  6/16/2009    Performed by AJ SUAREZ at SURGERY SAME DAY [facility] ORS   • EXPLORATORY LAPAROTOMY     • HYSTERECTOMY, TOTAL ABDOMINAL     • OTHER      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      Current Outpatient Prescriptions on File Prior to Encounter   Medication Sig Dispense Refill   • cloNIDine (CATAPRES) 0.1 MG Tab TAKE 1 TAB BY MOUTH DAILY AS NEEDED WHEN SYSTOLIC BP >170 OR DIASTOLIC>99 30 Tab 3   • levalbuterol (XOPENEX HFA) 45 MCG/ACT inhaler Inhale 2 Puffs by mouth every four hours as needed for Shortness of Breath. 1 Inhaler 11   • esomeprazole (NEXIUM) 40 MG delayed-release capsule Take 1 Cap by mouth every day. 90 Cap 3       Family History  Family History   Problem Relation Age of Onset   • Hypertension Mother    • Breast Cancer Mother    • Heart Disease Mother    • Dementia Father    • Other Son      Epilepsy       Social History  Social History   Substance Use Topics   • Smoking status: Former Smoker     Packs/day: 1.00     Years: 52.00     Types: Cigarettes     Start date: 1/17/1947     Quit date: 12/20/2013   • Smokeless tobacco: Never Used      Comment: 1 pk a day for 40 yrs   • Alcohol use No       Allergies  Allergies   Allergen Reactions   • Codeine Rash and Vomiting     Pt sates rash and vomiting.   • Dilaudid [Hydromorphone Hcl]    • Epinephrine Palpitations     Per pt, allergic to epinephrine in local anesthetic    • Erythromycin Diarrhea and Vomiting   • Fentanyl    • Heparin      Per historical: thrombocytopenia.   • Meperidine Rash and Vomiting     Okay with Promethazine.   • Milk  "Products Food Allergy      Sensitivity to it. Causes bloating.    • Morphine Rash and Vomiting   • Novocain      With epinephrine, causes patient to go into A-Fib.   • Other Drug      States allergic to most local anesthetics with epi   • Oxycodone    • Sulfa Drugs      Pt unsure of reaction.   • Talwin      \"Makes me go bezerk.\"   • Tape Rash     Pt states rash.   • Tylenol    • Warfarin Sodium      Due to also taking Nexium, pt's INR was 21.        Physical Exam  Laboratory   Hemodynamics  Temp (24hrs), Av.6 °C (97.9 °F), Min:36.3 °C (97.3 °F), Max:36.9 °C (98.5 °F)   Temperature: 36.9 °C (98.5 °F)  Pulse  Av.5  Min: 74  Max: 97 Heart Rate (Monitored): 88  Blood Pressure : 130/80, NIBP: 143/76      Respiratory      Respiration: 18, Pulse Oximetry: 92 %        RUL Breath Sounds: Clear, RML Breath Sounds: Clear, RLL Breath Sounds: Diminished, MARIBEL Breath Sounds: Diminished, LLL Breath Sounds: Diminished    Physical Exam   Constitutional: She is oriented to person, place, and time. She appears well-developed and well-nourished. No distress.   HENT:   Head: Normocephalic and atraumatic.   Eyes: Conjunctivae are normal. No scleral icterus.   Neck: Neck supple. No JVD present.   Cardiovascular: Normal rate, regular rhythm and normal heart sounds.  Exam reveals no gallop and no friction rub.    No murmur heard.  Pulmonary/Chest: Effort normal. No respiratory distress. She exhibits no tenderness.   Poor breath sounds all lung fields left chest     Abdominal: Soft. Bowel sounds are normal. She exhibits no distension. There is no guarding.   Musculoskeletal: She exhibits no edema or tenderness.   Neurological: She is alert and oriented to person, place, and time. No cranial nerve deficit.   Skin: Skin is warm and dry. She is not diaphoretic. No erythema. No pallor.   Psychiatric: Her behavior is normal.   Very anxious     Nursing note and vitals reviewed.      Recent Labs      18   1255   WBC  8.9   RBC  4.75 "   HEMOGLOBIN  14.8   HEMATOCRIT  47.2*   MCV  99.4*   MCH  31.2   MCHC  31.4*   RDW  48.7   PLATELETCT  277   MPV  10.4     Recent Labs      04/09/18   1632  04/09/18 1928   SODIUM  137  141   POTASSIUM  4.1  4.0   CHLORIDE  102  102   CO2  29  33   GLUCOSE  88  109*   BUN  12  12   CREATININE  0.77  0.75   CALCIUM  9.9  9.9     Recent Labs      04/09/18   1255  04/09/18   1632  04/09/18 1928   ALTSGPT   --   6  11   ASTSGOT   --   14  15   ALKPHOSPHAT   --   78  95   TBILIRUBIN   --   0.4  0.4   LIPASE  23   --    --    GLUCOSE   --   88  109*     Recent Labs      04/09/18   1414  04/09/18 1928   APTT  33.7  33.0   INR  1.07   --      Recent Labs      04/09/18   1255   BNPBTYPENAT  23         Lab Results   Component Value Date    TROPONINI <0.01 04/09/2018     Urinalysis:    Lab Results  Component Value Date/Time   SPECGRAVITY 1.020 10/29/2015 1930   GLUCOSEUR Negative 10/29/2015 1930   KETONES Negative 10/29/2015 1930   NITRITE Negative 10/29/2015 1930   WBCURINE Packed (A) 10/29/2015 1930   RBCURINE 0-2 10/29/2015 1930   BACTERIA Few (A) 10/29/2015 1930   EPITHELCELL Few 10/29/2015 1930        Imaging  Cxr: large pleural effusion left chest.       Assessment/Plan     I anticipate this patient will require at least two midnights for appropriate medical management, necessitating inpatient admission.    COPD (chronic obstructive pulmonary disease) (CMS-HCC)- (present on admission)   Assessment & Plan    No acute exacerbation  RT protocol  Resume home meds  Chronically on CPAP 24h/day          CAD (coronary artery disease), native coronary artery- (present on admission)   Assessment & Plan    Resume home medications  Much of patient's medical care has been at [place]          Anxiety- (present on admission)   Assessment & Plan    Xanax dependent for >20 years  Home dose re-ordered 0.5mg po q 4h prn.        Chest pain- (present on admission)   Assessment & Plan    Intermittent pain in chest with trouble  breathing  Significant left pleural effusion on CXR  Trend troponin - currently normal  Tele monitoring          Chronic abdominal pain- (present on admission)   Assessment & Plan    States worse with PO intake  States is not eating but has not lost any weight.  CT abdomen/pelvis with PO contrast  States chronic constipation          Paroxysmal atrial fibrillation (CMS-HCC)- (present on admission)   Assessment & Plan    S/p pacer placement  No on AC  Rate controlled          Sleep apnea- (present on admission)   Assessment & Plan    Patient wears CPAP 24h/day          Urinary incontinence   Assessment & Plan    Has been for past 1-2 weeks, wearing diaper  States she can only urinate in supine position        Pleural effusion   Assessment & Plan    Thoracentesis pending  Fluid studies pending          Nausea- (present on admission)   Assessment & Plan    Chronic issue  Anti-emetics prn          Obesity- (present on admission)   Assessment & Plan    Morbid obesity              VTE prophylaxis: scds.

## 2018-04-10 NOTE — PROGRESS NOTES
2 RN skin check completed with Konrad    Skin appears intact, ears are slightly pink but blanchable, elbows, back, heels, and sacral area all intact    Patient has scarring from surgeries on her back

## 2018-04-10 NOTE — PROGRESS NOTES
Assumed care of patient. Assessment complete. Patient complains of 6/10 left chest pain at this time.  Educated to use call light for assistance. Patient is a HIGH RISK according to Milly Hennessy Fall Risk Assessment. Fall precautions in place. Tele box in place. Will continue to monitor with hourly rounding.

## 2018-04-10 NOTE — PROGRESS NOTES
Pt resting in bed, A&Ox4, anxious.  Reports of chest pressure, declines intervention.  Discussed POC with pt.  Call light within reach, bed alarm on.

## 2018-04-10 NOTE — DISCHARGE PLANNING
Medical Social Work    SW asked to assist Pt with her  and hotel needs.  RN states Pt wants  at bedside and is upset with staff that he will not be allowed to sleep at her bedside.    GAYATHRI met with Pt. Pt expressed much concern over having her  stay home alone as he is recovering from a medical stay at [facility] around December-January. He was in ICU , intubated and very sick for a couple months. Pt expressed her anxiety over her  being alone and her not being able to check on  Him. SW spoke with Pt regarding her concerns and the hospitals inability to give her a private room at this time. Pt discussed with her  and it was decided he would go home tonight. He will call Pt once he is home to let her know he is safe.    Pt advised to call SW through the night if her anxiety increases or she needs us to provide a safety check at the home.  ER GAYATHRI Paredes updated on Pt and her situation.   GAYATHRI will be available for needs.

## 2018-04-11 NOTE — PROGRESS NOTES
Report received from night RN. Assumed care of patient at 0700. Patient is resting comfortably, CPAP is on. Call light and personal belongings are within reach. Bed is in low and locked position. Will continue to monitor.

## 2018-04-11 NOTE — PROGRESS NOTES
Bedside report received from day shift nurse. Patient assessed and resting comfortably at this time with no complaints of pain or other needs voiced. Call light within reach. Will continue to monitor.

## 2018-04-11 NOTE — PROGRESS NOTES
Patient complaining of bleeding at IV site, IV no longer patent.  Patient refuses another IV placement and does not have any IV medications ordered at this time.  Notified Dr. Lopez.  Per MD, patient ok not to have IV.

## 2018-04-11 NOTE — RESPIRATORY CARE
"COPD EDUCATION by COPD CLINICAL EDUCATOR  4/11/2018  at  10:18 AM by Deepali Marquez     Patient interviewed by COPD education team.  Patient unable to participate in full program.  Short intervention has been conducted.  She presents with CPAP of 12 on Room air. Removed and placed on O2 at 5 LPM with SpO2 monitoring during conversation. SpO2 % with -0- signs of respiratory distress. Bedside conversation reveals a woman in pain -post pleural tap. RN notified. Expressing desire \"to be done\" and not want to live. Then changes her story and complains about poor care with doctors and no one will listen to her.  Listened with empathy. Call to RN. Met with Dr Monaco and relayed patient concerns and requested Palliative Care and  consults. Her primary care provider at home is her SO (Darnell) he is not well either. Dr Monaco will make appropriate referrals.  "

## 2018-04-11 NOTE — PROGRESS NOTES
Patient assessed and resting comfortably with no complaints of pain or other needs voiced. Call light within reach. Will continue to monitor.

## 2018-04-11 NOTE — PROGRESS NOTES
Patient refused AM aspirin, clonidine, Prilosec, and stool softener.  Educated patient about need for medication, including stool softener as patient is complaining of constipation and last bowel movement was 04/08/18. Patient verbalized understanding but continues to refuse.

## 2018-04-11 NOTE — CARE PLAN
"Problem: Respiratory:  Goal: Respiratory status will improve  Outcome: PROGRESSING AS EXPECTED  Patient respiratory status assessed. Patient educated on importance of coughing and deep breathing. Patient has continuous pulse monitoring in place. Will continue to monitor patient's respiratory status.    Problem: Pain Management  Goal: Pain level will decrease to patient's comfort goal  Outcome: PROGRESSING AS EXPECTED  Patient educated to pain scale system. Patient encouraged to verbalize discomfort. Patient taught about non-pharmacological pain management. Patient was given tylenol for pain this morning and states that \"it is working well\".      "

## 2018-04-11 NOTE — PROGRESS NOTES
Patient assessed and resting comfortably at this time with no complaints of pain or other needs voiced. Call light within reach. Will continue to monitor.

## 2018-04-11 NOTE — THERAPY
"Occupational Therapy Evaluation completed.   Functional Status:  Pt s/p thoracentesis 2/2 pleural effusion, pt demonstrates significant decline with ADLs due to impaired balance, decreased activity tolerance and generalized weakness. Performed bed mobility with mod/max a, LB dressing and toileting max a, STS from eob with min a, UB dressing mod a for line management.  Pt would benefit from acute and post acute skilled therapy services prior to d/c home.   Plan of Care: Will benefit from Occupational Therapy 3 times per week  Discharge Recommendations:  Equipment: Will Continue to Assess for Equipment Needs. Post-acute therapy Discharge to a transitional care facility for continued skilled therapy services.    See \"Rehab Therapy-Acute\" Patient Summary Report for complete documentation.    "

## 2018-04-11 NOTE — THERAPY
"Physical Therapy Evaluation completed.   Bed Mobility:  Supine to Sit: Moderate Assist  Transfers: Sit to Stand: Minimal Assist  Gait: Level Of Assist: Contact Guard Assist with Front-Wheel Walker x few side steps along edge of bed.        Plan of Care: Will benefit from Physical Therapy 3 times per week  Discharge Recommendations: Equipment: Will Continue to Assess for Equipment Needs.  Pt presents s/p thoracentesis. Pt have been sedentary, not getting out of bed much for last 3 months. Pt will benefit from inpt PT to address problems and assist with d/c plan. Pt will need a post acute therapy stay before home.   See \"Rehab Therapy-Acute\" Patient Summary Report for complete documentation.     "

## 2018-04-12 NOTE — PROGRESS NOTES
Dr. Lopez notified of urinanalysis results. First dose of cetriaxone will be given in the hospital. Antibiotic therapy will be started once patient has discharged. Confirmed that patient will follow up with APRN appointment tomorrow for referral to oncology.

## 2018-04-12 NOTE — PROGRESS NOTES
Bedside report received from day shift nurse. Patient assessed and resting comfortably at this time with no complaints of pain or other needs voiced. Call light within reach. Will continue to monitor

## 2018-04-12 NOTE — CONSULTS
"Reason for PC Consult: Advance Care Planning    Consulted by:   Dr. Lopez    Assessment:  General:   82 year old female admitted for pleural effusion on 18. Pt has a history of COPD, unstable angina, anemia, a-fib, cataracts, bronchitis, CAD, hypercholesteremia, HTN, MI, PNA, anxiety, sleep apnea, and thyroiditis. Pt presented to [facility] ED after family urged her to get evaluated after needing to wear CPAP 24 hours/day and mainly bed bound.     Dyspnea: No, 96% on 5L NC  Last BM: 18    Pain: No    Depression: Mood appropriate for situation   Dementia: No    Spiritual:  Is Confucianist or spirituality important for coping with this illness? Yes, Pt has already have had visits from   Has a  or spiritual provider visit been requested? No    Palliative Performance Scale: 70%    Advance Directive: None on File   DPOA: None on File, TANIA Lima ([phone]); Yousif Hopson ([phone])    POLST: None on File      Code Status: Full      Outcome:  PC RN visited pt and spouse at bedside, introduced self and role of PC. Discussed pt's understanding of clinical picture, pt verbalized that she was recently diagnosed with lung cancer and is still trying to processes it. PC RN explored pt's thoughts and feelings, pt did express worry about telling her son. He has a seizure disorder and she is worried about how he will handle the cancer diagnosis. Pt also expressed spiritual and emotional angst about facing lung cancer, although she is optimistic about treatment she still has fear about cancer. Her mother had  of cancer and she watched her die. 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 Pt stated, \"Just because I have a background in grief therapy doesn't mean I don't need it now\". PC RN validated pt's feelings and encouraged pt to " "express her thoughts.     Pt shared with PC RN about regrets in her past about not seeing a pt who had requested she see him before she left for the day, she actually had stayed late and wasn't able to see that person. She thought she would just return early the next day, and went home. The following day that pt she was supposed to see the night prior had . Pt feels regret and in the face of cancer diagnosis she is engaging in life review and thinking of all the regrets she has throughout her life. PC RN explored pt thoughts on the meaning of internalizing and life review, pt expressed she wanted to address her angst. PC RN explored where pt finds spiritual support, pt states she had the  come and sit with her for a while and that has helped.     PC RN asked pt about GOC, pt states she wants to go to [place] and get treatment there for her cancer. Pt has not had the best support from her oncologist here and wants to return to [place] for treatment. PC RN discussed advanced directive, pt declined completing an AD as she wants her family to make decisions, she feels it is not for her to decide what would be best for the family. PC RN discussed it is important to express her wishes, pt states she has expressed her wishes to her  and son and \"thats enough\".     Pt's bedside RN came and pt thanked PC RN for visiting with her, PC RN provided contact card and encouraged pt to call with any questions or concerns.    Active listening, reminiscing, validation, guided life review and emotional support provided.    Updated:   Dr. Lopez    Plan:   Pt wishes to pursue cancer treatment at [place].    Recommendations: I do not recommend an ethics or hospice consult at this time because pt wishes for full treatment at this time.    Thank you for allowing Palliative Care to participate in this patient's care. Please feel free to call [phone] with any questions or concerns.  "

## 2018-04-12 NOTE — PROGRESS NOTES
Report received from night RN. Assumed care of patient at 0700. Patient is resting comfortably with no statements of pain or discomfort at this time. CPAP is being utilized at this time, no signs of dyspnea. Call light and personal belongings are within reach. Bed is in low and locked position. Will continue to monitor.

## 2018-04-12 NOTE — PROGRESS NOTES
[facility] Hospitalist Progress Note    Date of Service: 2018    Chief Complaint  82 y.o. female with a history of chronic oxygen dependent COPD, paroxysmal atrial fibrillation not on anticoagulation, GERD, hypertension admitted 2018 with increased shortness of breath, found to have a large left-sided pleural effusion.    Interval Problem Update  4/10: BNP is normal therefore unlikely related heart failure, she has no evidence of infection. Highly suspicious for malignancy due to a history of pulmonary nodules on the left.  1100 mL of fluid were removed via thoracentesis. She is currently refusing biopsy of pulmonary nodules  : Repeat x-ray showed improved size of effusion since thoracentesis. Volume loss on the left. Patient anxious, working on outpatient follow-up with oncology.    Consultants/Specialty  IR    Disposition  F/U fluid cytology  Recommended biopsy of pulmonary nodules, patient declines at this time stating she wants a PET scan first.        Review of Systems   Constitutional: Negative for chills, fever and malaise/fatigue.   HENT: Negative for sore throat.    Respiratory: Positive for cough and shortness of breath.    Cardiovascular: Negative for chest pain and palpitations.   Gastrointestinal: Positive for constipation. Negative for abdominal pain, blood in stool, diarrhea, heartburn, nausea and vomiting.   Genitourinary: Negative for dysuria and frequency.   Musculoskeletal: Negative for back pain and myalgias.   Neurological: Negative for dizziness, focal weakness, weakness and headaches.   Psychiatric/Behavioral: Negative for depression and hallucinations. The patient is nervous/anxious.    All other systems reviewed and are negative.     Physical Exam  Laboratory/Imaging   Hemodynamics  Temp (24hrs), Av.7 °C (98 °F), Min:36.4 °C (97.6 °F), Max:36.9 °C (98.5 °F)   Temperature: 36.6 °C (97.8 °F)  Pulse  Av.9  Min: 61  Max: 98    Blood Pressure : 129/67      Respiratory       Respiration: 18, Pulse Oximetry: 96 %        RUL Breath Sounds: Clear, RML Breath Sounds: Diminished, RLL Breath Sounds: Diminished, MARIBEL Breath Sounds: Clear, LLL Breath Sounds: Diminished    Fluids    Intake/Output Summary (Last 24 hours) at 04/11/18 1733  Last data filed at 04/11/18 0800   Gross per 24 hour   Intake              200 ml   Output                0 ml   Net              200 ml       Nutrition  Orders Placed This Encounter   Procedures   • DIET ORDER     Standing Status:   Standing     Number of Occurrences:   1     Order Specific Question:   Diet:     Answer:   2 Gram Sodium [7]     Order Specific Question:   Diet:     Answer:   Cardiac [6]     Physical Exam   Constitutional: She is oriented to person, place, and time. She appears well-developed and well-nourished. She appears distressed.   HENT:   Head: Normocephalic.   Mouth/Throat: No oropharyngeal exudate.   Eyes: Pupils are equal, round, and reactive to light. No scleral icterus.   Neck: Normal range of motion. No JVD present. No thyromegaly present.   Cardiovascular:   No murmur heard.  Tachycardic   Pulmonary/Chest: She has no wheezes. She has no rales. She exhibits no tenderness.   Tachypnea, she has decreased breath sounds throughout on the left   Abdominal: Soft. Bowel sounds are normal. She exhibits no distension. There is no tenderness. There is no rebound.   Musculoskeletal: Normal range of motion. She exhibits no edema or tenderness.   Neurological: She is alert and oriented to person, place, and time. No cranial nerve deficit. Coordination normal.   Skin: No rash noted. No erythema.   Psychiatric:   Very anxious, tangential       Recent Labs      04/09/18   1255  04/10/18   0220  04/11/18   0820   WBC  8.9  5.3  9.5   RBC  4.75  3.73*  3.97*   HEMOGLOBIN  14.8  11.8*  12.5   HEMATOCRIT  47.2*  37.6  40.1   MCV  99.4*  100.8*  101.0*   MCH  31.2  31.6  31.5   MCHC  31.4*  31.4*  31.2*   RDW  48.7  48.2  48.9   PLATELETCT  277  211  219  "  MPV  10.4  10.5  10.4     Recent Labs      04/09/18   1928  04/10/18   0220  04/11/18   0820   SODIUM  141  139  139   POTASSIUM  4.0  4.1  4.4   CHLORIDE  102  103  101   CO2  33  31  32   GLUCOSE  109*  95  108*   BUN  12  12  15   CREATININE  0.75  0.60  0.70   CALCIUM  9.9  9.8  9.8     Recent Labs      04/09/18   1414  04/09/18 1928   APTT  33.7  33.0   INR  1.07   --      Recent Labs      04/09/18   1255   BNPBTYPENAT  23              Assessment/Plan     * Pleural effusion   Assessment & Plan    Nodularity along the pleura are highly suspicious for neoplasm. She has no evidence of clinical pneumonia and therefore I doubt parapneumonic.   Thoracentesis showed RBCs, no evidence of empyema.  Neutral pH, culture and cytology are pending  F/U fluid culture and cytology  Results of imaging were discussed with the patient  Consult placed to oncology nurse navigator in order to establish care for outpatient follow-up including PET scan, staging, biopsy when indicated          Multiple lung nodules on CT- (present on admission)   Assessment & Plan    She states this has been present for the last 16 months. She has been recommended a biopsy of these lesions in the past however, she states she \"would like a PET scan 1st\" he is very resistant, it seems to going forward with further evaluation, although she says she would like to, sheath was noted to each that it is offered.  -Follow-up pleural fluid cytology as it may help confirm diagnosis of malignancy  -I have recommended a biopsy of the lung lesions, she is currently declining stating she would like a PET scan 1st  -Consider discharge in a.m., to arrange follow up outpatient with oncology if cytology is positive.        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        Chest pain- (present on admission)   Assessment & Plan    Very likely due to left-sided effusion. Intermittent pain in chest with trouble breathing  Significant left pleural effusion on CXR  Trend troponin - currently normal  Tele monitoring          Chronic abdominal pain- (present on admission)   Assessment & Plan    States worse with PO intake.  Abdominal CT shows no evidence of acute pathology. It did confirm a loculated left-sided effusion with nodularity of the pleura concerning for malignancy.  States is not eating but has not lost any weight.  States chronic constipation, unchanged from baseline  -Continue bowel regimen  -Palliative care consult          Paroxysmal atrial fibrillation (CMS-HCC)- (present on admission)   Assessment & Plan    S/p pacer placement  Not on AC, she states she has a heparin and Lovenox allergy.  Rate controlled, continue diltiazem 240 mg twice a day          Macrocytic anemia- (present on admission)   Assessment & Plan    Check iron studies, B12, folate  -Repeat in the morning        Sleep apnea- (present on admission)   Assessment & Plan    Patient wears CPAP 24h/day.  I suspect this is partially due to anxiety which is diseased by positive pressure.  -Continue 6 L O2 versus CPAP for comfort.          Acute on chronic respiratory failure with hypoxia (CMS-HCC)   Assessment & Plan    At baseline she wears 2-6 L of oxygen depending on anxiety levels. She has been wearing CPAP 24 hours a day as she states she feels better with this, but he confirmed that she actually was doing well on 4 L nasal cannula without CPAP  -Wean from CPAP as patient however she is refusing to have it weaned  -Suspect her acute failure on chronic  O2 requirements is related to large left pleural effusion which is highly suggestive of malignancy.  -Wean O2 following thoracentesis        Urinary incontinence   Assessment & Plan    Has been for past 1-2 weeks, wearing diaper  States she can only urinate in supine position  Bladder scan        Nausea- (present on admission)   Assessment & Plan    Chronic issue  Anti-emetics prn        Obesity- (present on admission)   Assessment & Plan    Morbid obesity          Quality-Core Measures   Reviewed items::  Labs reviewed, Medications reviewed and Radiology images reviewed  Ro catheter::  No Ro  DVT prophylaxis - mechanical:  SCDs

## 2018-04-12 NOTE — PROGRESS NOTES
Cancer nurse navigation referral from inpatient.  Needs assistance with outpatient follow up.  See inpatient EPIC note for details.

## 2018-04-12 NOTE — PROGRESS NOTES
"Referral for cancer nurse navigation received.  Met with patient.  Pt provided some previous history.  Discussed several \"negative\" experiences with [facility] and physicians.  Reported had seen two physicians, one who did not want to \"read 71 page report from [facility]\" and another physician who told her he \"gets paid\" whether she shows up or not.  Pt states that she has requested PET in past. Multiple times patient changing information she is providing and off topic timeframe.  She talked about getting treatment at [facility] and  also with cancer diagnosis.  He was treated at [facility] and it cost $400 a night to stay there for several months for him to be treated.  Talking about how she can't afford that and that is why she did not follow up.  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  Difficulty following correlation of what she is saying to current topic.  Pt was emotionally upset reporting no one cares how she feels and they act like her \"head is chopped off\".  Assuming she means they do not address her as a person.  Provided active listening and encouraging comments.  Pt saying she was told she has \"cancer\".  Does not want treatment at [facility] but wants to go back to [facility].  In some part of conversations reporting she has a house her, in [place] as well as [place].  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  Does this from her home, so currently job not in jeopardy.  Talked about her [facility] is the hospital  of this business which is in [place], [place].  Pt also complaining about timing of her medication and how important it was to get medications on time.  Gave patient information on cancer nurse navigation and role of navigator.  Attempted to understand what her wishes were and how to best help patient.  She stated she will not get biopsy unless has a \"MD\" anesthesiologist\" because nurse outcomes are worse.  Talked " "about can not tolerate narcotics on top of her xanax because she \"stops breathing\".  Pt is willing to do PET scan here while waiting for consult at [facility].  Pt did not remember oncologist name who treated her , but would be interested in being referred to them.  Pt could not recall names of any physicians that treated her in the past at [facility].  Pt talked about had been \"misdiagnosed\" before.  Was told had \"squamous cell cancer\" and when they did lung surgery to remove mass it was \"a fibrous tumor and benign\" back in 2006.  Pt reporting has follow up with physician when discharged who will refer her to [facility] and send them all the information.  Provided patient with contact information.  Will follow up and support patient with needed follow up.    Pt will need to establish with pcp for referral and PET scan.    "

## 2018-04-13 NOTE — PROGRESS NOTES
Patient discharged to home via car with relative. Discharge paperwork was discussed with the patient. LDAs were removed. Tele monitor disconnected. Pt prescriptions sent to pharmacy. Patient escorted out on scooter with RN.

## 2018-04-13 NOTE — PROGRESS NOTES
"4/13/18 11:10am  CM post discharge outreach call first attempt.  Patient's significant other, Alex answered telephone.  Reports patient is currently sleeping.  SO requested CM call back later today.  CM provided CM contact telephone number.  CM will attempt to call patient later.     4/13/18 12:51 CM second attempt at post discharge outreach call.  Patient answered telephone.  Patient states she isn't feeling good. Reports having \"a lot of pain\".  States she is still feeling short of breath. States she would like CM to talk to her significant other, Alex Lima. Patient gave CM permission to discuss any health related information with Alex Lima. CM reviewed upcoming appointment with SO.  Alex reports he doesn't think he can get patient to appointment at PCP today at 3:00pm.  Reports patient is very weak. Reports patient has been in bed and is not able to ambulate.  CM recommended significant other call 911 and have patient go back to ER if having worsening symptoms.  Significant other voiced understanding.   "

## 2018-04-13 NOTE — DISCHARGE SUMMARY
CHIEF COMPLAINT ON ADMISSION  Chief Complaint   Patient presents with   • Chest Pain   • Shortness of Breath       CODE STATUS  Full Code    HPI & HOSPITAL COURSE  This is a 82 y.o. female with a history of chronic oxygen dependent COPD, paroxysmal atrial fibrillation not on anticoagulation, GERD, hypertension admitted 4/9/2018 with increased shortness of breath, found to have a large left-sided pleural effusion.  The patient has previously been seen at [facility] in the past. She states that she was diagnosed with left-sided lung nodules about 2 years ago however, never had these nodules biopsied. She states that she was only offered a nurse anesthetist rather than an M.D. anesthetist which she was not comfortable with therefore she refuses the procedure on several occasions. Review of the records showed that she did have a CT of the chest done in January 2018 that also revealed lung nodules, described as a 2.3 cm spiculated mass superior segment left lower lobe concerning for malignancy, 5.6 mm nodule superior segment left lower lobe that may correspond to previously demonstrated nodule, 9.2 mm left lower lobe nodule that is new and also to right upper lobe nodules that are unchanged from previous imaging comparison. She had small bilateral pleural effusions noted at that time. She complained of chronic and vague abdominal discomfort therefore abdominal CT was performed that was without any acute abdominal pathology but did demonstrate nodular pleura which suggested malignancy. She underwent a therapeutic and diagnostic thoracentesis on 4/10. This showed no evidence of infection. Her BNP was also normal therefore effusion related to heart failure was thought unlikely.  Due to the CT findings, malignancy was high on the differential. The fluid that was removed was bloody and there was approximately 1100 mL's taken out of the left thorax.     The patient was only requiring 4 L nasal cannula at rest. She just states  "that she \"needs CPAP\" and she actually refused to have this removed, despite her saturations measuring stable without it.    She was again recommended biopsy of these lesions however, the patient refused this intervention during her hospital stay. The patient was very anxious and also resistant to any guidance from medical providers regarding next steps in therapy. The oncology nurse navigator was consulted and did attempt to arrange follow-up with oncology however, the patient stated that she will be going to [place] in order to pursue treatment and she would prefer to not have an oncology referral during this hospital stay.    She was arranged to follow-up with her primary care physician who can provide any referrals if needed for local oncology and renal or get her set up to follow up with oncology at [place].    She did complain of dysuria and UA was evaluated. It showed packed white blood cells concerning for UTI. 2. Description of symptoms she was given cefpodoxime to complete for 5 days antibiotics course. She had no evidence of nephritis or sepsis.    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    Therefore, she is discharged in guarded and stable condition with close outpatient follow-up.    SPECIFIC OUTPATIENT FOLLOW-UP  Follow-up with PCP in order to get oncology referral, PET scan or referral to oncology at [place]    DISCHARGE PROBLEM LIST  Principal Problem:    Pleural effusion POA: Unknown  Active Problems:    Anxiety POA: Yes    CAD (coronary artery disease), native coronary artery POA: Yes      Overview: History of MI 20+ years ago, but near-normal MPI February 2014    COPD (chronic obstructive pulmonary disease) (CMS-HCC) POA: Yes    Multiple lung nodules on CT POA: Yes    Sleep apnea POA: Yes    Macrocytic anemia POA: Yes    Paroxysmal " atrial fibrillation (CMS-HCC) POA: Yes    Chronic abdominal pain POA: Yes    Chest pain POA: Yes    Obesity POA: Yes    Nausea POA: Yes    Urinary incontinence POA: Unknown    Acute on chronic respiratory failure with hypoxia (CMS-HCC) POA: Unknown  Resolved Problems:    * No resolved hospital problems. *      FOLLOW UP  Future Appointments  Date Time Provider Department Center   4/13/2018 11:20 AM CARE MANAGER [facility]   4/13/2018 3:00 PM LASHANDA Song [facility]   5/23/2018 1:40 PM Keyana Sierra M.D. [facility] None   8/1/2018 4:20 PM Mohan Vides M.D. [facility] None     Primary Care Provider    Schedule an appointment as soon as possible for a visit in 1 week  Hospital follow-up appointment with PCP      MEDICATIONS ON DISCHARGE   Emily Hopson   Home Medication Instructions THELMA:[id number]    Printed on:04/12/18 7897   Medication Information                      acetaminophen (TYLENOL) 500 MG Tab  Take 1,000 mg by mouth every 8 hours as needed for Mild Pain.             ALPRAZolam (XANAX) 0.5 MG Tab  Take 0.5 mg by mouth every 4 hours.             aspirin (ASA) 325 MG Tab  Take 1 Tab by mouth every day.             cefdinir (OMNICEF) 300 MG Cap  Take 1 Cap by mouth 2 times a day for 6 days.             cloNIDine (CATAPRES) 0.1 MG Tab  TAKE 1 TAB BY MOUTH DAILY AS NEEDED WHEN SYSTOLIC BP >170 OR DIASTOLIC>99             clotrimazole-betamethasone (LOTRISONE) 1-0.05 % Cream  Apply 1 Application to affected area(s) 2 times a day. Apply to hands             DILTIAZem CD (CARDIZEM CD) 240 MG CAPSULE SR 24 HR  Take 240 mg by mouth 2 Times a Day.             esomeprazole (NEXIUM) 40 MG delayed-release capsule  Take 1 Cap by mouth every day.             levalbuterol (XOPENEX HFA) 45 MCG/ACT inhaler  Inhale 2 Puffs by mouth every four hours as needed for Shortness of Breath.             ondansetron (ZOFRAN ODT) 4 MG TABLET DISPERSIBLE  Take 4 mg by mouth every four hours as needed for Nausea.                  DIET  Orders Placed This Encounter   Procedures   • DIET ORDER     Standing Status:   Standing     Number of Occurrences:   1     Order Specific Question:   Diet:     Answer:   2 Gram Sodium [7]     Order Specific Question:   Diet:     Answer:   Cardiac [6]       ACTIVITY  As tolerated.  Weight bearing as tolerated      CONSULTATIONS  None    PROCEDURES  4/10: Left thoracentesis, 1100 mL's fluid drained, no evidence of infection, positive for metastatic adeno carcinoma    LABORATORY  Lab Results   Component Value Date/Time    SODIUM 139 04/11/2018 08:20 AM    POTASSIUM 4.4 04/11/2018 08:20 AM    CHLORIDE 101 04/11/2018 08:20 AM    CO2 32 04/11/2018 08:20 AM    GLUCOSE 108 (H) 04/11/2018 08:20 AM    BUN 15 04/11/2018 08:20 AM    CREATININE 0.70 04/11/2018 08:20 AM    CREATININE 1.0 04/28/2009 04:40 AM        Lab Results   Component Value Date/Time    WBC 9.5 04/11/2018 08:20 AM    HEMOGLOBIN 12.5 04/11/2018 08:20 AM    HEMATOCRIT 40.1 04/11/2018 08:20 AM    PLATELETCT 219 04/11/2018 08:20 AM        Total time of the discharge process exceeds 45 minutes

## 2018-04-16 NOTE — TELEPHONE ENCOUNTER
"Called pt. Re: Diltiazem refill, to clarify dosage. She was just DC'd from [facility] (dx p[leural effusion, cytology positive for metastatic adenocarcinoma) and stated, \"They kicked me out and told me to get a PET scan as an outpt.\" She is understandably anxious about dx. She had appointment with Teri Schmitz on 4-18-18 but it was canceled. Pt. Given contact number to call back to reschedule. \"I can't go anywhere in a car. I need a fan and my oxygen and CPAP.\" Pt. Was advised to return to ER in that case.   "

## 2018-04-16 NOTE — TELEPHONE ENCOUNTER
To Dr. Vides re: Diltiazem 240mg CD twice daily noted in Epic, ordered by Dr. Lopez. Do you want her to take this dose?

## 2018-04-17 NOTE — PROGRESS NOTES
Emily,     Thank you so much for taking the time to talk to me today and clarify information.  I hope you did not feel I was asking too many questions.  I just wanted to know how I can best help you.  Talking to you and Maurisio helped me better understand your current situation.  I am glad you are safe, but understand that traveling to appointments is very difficult if not impossible for you right now related to your breathing concerns.  I am working on seeing if I can get you into see a doctor and if there is a way I can assist with what you need with your oxygen.  I will call you as soon as I have more information and answers.     Thank you again.  Sincerely,     Latonya

## 2018-04-17 NOTE — PROGRESS NOTES
"Claude Hanks!    It is such a pleasure to hear from you and chat with you.  Your heartfelt caring means so much to us during this time.  Thank you for asking us the important questions that, at this time, impact our lives.  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   Your follow-up contact after hospital discharge is extremely appreciated by both Maurisio and myself.  In addition, we wish to let you know how much it means to us to have you in our lives right now helping us to find proper and prompt medical care.  We will continue to check our email several times a day as well as listen for a telephone call from you, whichever works best for you.  Please know I will cooperate with any plans you make for me to the best of my ability.    Sending a BIG thank you to a Real nurse.    Warm Regards,    Emily   "

## 2018-04-17 NOTE — PROGRESS NOTES
"Call placed to patient for follow up from hospital.  Pt reporting is \"depressed, in shock, hurting\".  She reports that she was told that nobody at hospital liked her and that she was \"uncooperative\".  She states that her oxygen dropped to 44% if not on CPAP.  They had called [facility] but talked about them not being able to transport to appointment.  Pt was unable to make discharge follow up appointment.  Pt talked about needing biopsy and PET scan.  Believes records were changed about what happened in past.  Talked about current records not telling true story.  Brought up information again about being given wrong medication by anesthesiology and ended up intubated in ICU.  Talked about how traumatic that was for her.  She again talked about being bed bound, wearing \"diaper\" and using \"potty chair\".  Discussed pt needing safer place and strengthening if she is not well enough to go to hospital.  Pt saying \"I can't get stronger if I am not able to breathe\".  Pt feeling no one listened to her when she felt she should be discharged to PET, ride her scooter to scan and then come back to hospital for re-admission.  Discussed with patient several times concern for her health if she is not well enough to take care of self or go to appointments.  Pt talked about breathing concerns again.  Pt very grateful for navigator listening to her.  Pt wondering if \"cytology\" was done on fluid taken from lung and if results came back.  Discussed cancer nurse navigator was not able to provide that information for her and that she needed to see physician in order to get results.  Discussed with patient will work to see if can get patient rescheduled sooner then current pcp appointment.  Pt much more focused than previous conversation.  She was able to express needs and concerns.    Call placed to Tianna, case coordination for feedback.  Concern again for patient safety and if she is not well enough to get to appointments then she should call " "BRYCESA to get her back to hospital.  If patient feels she can safely get to outpatient follow up, she can get appointment scheduled for discharge follow up clinic.    Call placed back to patient.  Was given additional information.  Pt saying everything is related to her breathing and oxygen.  She is unable to travel without continues oxygen.  Pt saying in different teminology.  Has to have \"forced oxygen\" not \"pulse oxygen\".  Uses fans and cpap at home.  She is also not strong enough to get into SUV to travel.  Banyan can not fit her scooter.  She reports does have a son that lives 180 miles away that will \"drop everything\" to help her out.  She states she has been getting to appointments \"past 7 years\" with scooter and help.  Now breathing concerns have made it more difficult.  Spoke with significant other, Maurisio.  He concurred with patient reporting it is the oxygen and breathing that is her main issue.  He feels safe if we could get \"forced oxygen\" to take her to physician.  Is asking for a \"battery operated pump\".  Again needs physician to order this.  Will follow up to see if this is possible and get patient scheduled for d/c follow up.  Pt uses [facility].  "

## 2018-04-17 NOTE — PROGRESS NOTES
"Pt may benefit from home health evaluation.  Unsure if declined while in the hospital.  No available physician currently to provide referral and hoping patient will be able to make it to hospital follow up appointment.  Was able to get patient an appointment for d/c follow up clinic, tomorrow at 3 pm at [address] with Dr Zhu.  Call placed to patient.  Spoke with Maurisio who reports patient was sleeping.  He took information down.  When asked if patient was going to call son for assistance, he reported \"that would be nice\".  Discussed with him previous concern about him helping her get into vehicle.  He stated that was the issue, her breathing is more the concern.  Pt did wake up and patient told her regarding appointment.  They will come to appointment tomorrow.  Discussed importance of patient making this appointment.  Acknowledge previous concerns and emphasized needing to make appointment in order to be able to evaluate and address concerns.  Maurisio thanking navigator for assistance and support.  "

## 2018-04-18 NOTE — NON-PROVIDER
If you need further assistance, or have any questions; concerns or lingering symptoms before seeing your Primary Care Provider or specialist.     Do not hesitate to contact us.      Please contact us at the Post-Hospital Follow Up Program at [phone].   Our offices hours are Monday-Friday 8 am-5 pm.

## 2018-04-18 NOTE — PROGRESS NOTES
Subjective:     Emily Hopson is a 82 y.o. female who presents for Hospital Follow-up.  Chart and discharge summary reviewed.   Date of discharge 4/12/18.  48- hour post discharge RN call completed on 4/13/18 and documented in the medical record by Tianna Rodriguez RN:  POST DISCHARGE CALL:  Discharge Date: 4/12/18   Date of Outreach Call: 4/13/2018 12:59 PM  Now that you're home, how are you doing? Poor  Comment:Pt reports she doesn't feel good. Reports she is  in a lot of pain.  Pt requesting CM talk to her significant  other, Alex Lima.  Do you have questions about your medications? No    Did you fill your medications? Yes    Do you have a follow-up appointment scheduled?Yes  Comment:Post discharge clinic 4/13/18 . Significant  other states he doesn't think he can get pt to appt.    Discharging Department: *    Number of Attempts: 2  Current or previous attempts completed within two business days of discharge? Yes  Provided education regarding treatment plan, medication, self-management, ADLs? Yes  Has patient completed Advance Directive? If yes, advise them to bring to appointment. No  Care Manager phone number provided? Yes  Is there anything else I can help you with? Yes         HPI: Patient is an 82 year old white female with a complicated medical history. Recently hospitalized for increasing shortness of breath. Her history is significant for oxygen dependent COPD, paroxysmal atrial fibrillation not on anticoagulation, GERD, hypertension, sleep apnea. She had a large left-sided pleural effusion. She underwent a therapeutic and diagnostic thoracentesis on 4/10/18. Pathology results of left pleural fluid was positive for metastatic adenocarcinoma. She had known pulmonary nodules and had suspected cancer. 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 Discharge note says that she declined biopsy but  "patient states she simply insisted on having an anesthesiologist instead of a nurse anesthetist. She met with the RN Nurse Navigator but declined oncology referral due to her preference for going to [facility].      Since returning home, patient reports feeling poorly.     The patient has questions regarding hospitalization or discharge: She requests a portable oxygen concentrator so she can get around using continuous oxygen. Her current portable unit just gives \"pulse\" oxygen. She also wonders if there is any pain medication she can safely take for her chronic chest pain.  The patient has weakness; has some difficulty taking care of self at home.  Patient reports taking medications as instructed.      Allergies:   Codeine; Dilaudid [hydromorphone hcl]; Epinephrine; Erythromycin; Fentanyl; Heparin; Meperidine; Milk products food allergy; Morphine; Novocain; Other drug; Oxycodone; Sulfa drugs; Talwin; Tape; Tylenol; and Warfarin sodium    Social History:  Social History   Substance Use Topics   • Smoking status: Former Smoker     Packs/day: 1.00     Years: 52.00     Types: Cigarettes     Start date: 1/17/1945     Quit date: 12/20/2013   • Smokeless tobacco: Never Used      Comment: 1 pk a day for 40 yrs   • Alcohol use No        ROS:    Constitutional:  Denies fever, chills, night sweats, she has general weakness.  HENT: Denies head congestion, ear pain or drainage, decreased hearing, sore throat  Eyes: Denies visual changes, eye drainage or redness, eye pain  Neck: Denies pain, swollen glands, decreased range of motion  Lungs: She has chronic shortness of breath  Cardiovascular: Has chronic chest pain, denies orthopnea, lower extremity edema, palpitations  Abdominal: Denies abdominal pain, change in bowel or bladder habits, nausea or vomiting  Musculoskeletal: She gets around in a scooter.  Endocrine: Denies unexplained weight changes, heat or cold intolerance, hair loss, polyuria or polydipsia  Neurological: Denies " dizziness, headache, confusion, focal weakness or numbness, memory loss  Psychiatric: She has chronic anxiety and is dependent on xanax.       Objective:     Blood pressure 144/78, pulse (!) 108, temperature 36.2 °C (97.1 °F), resp. rate (!) 24, SpO2 93 %. (4 L O2 nasal cannula)  Her initial O2 saturation was 88% and she was not getting adequate oxygen. After being put on 4 L her respiration rate came down to 18-20 and O2 saturation maren to 94%.    Physical Exam:    GEN:  Alert, oriented, in no distress  HEENT:  PERRLA, EOMI  LUNGS:  Clear to auscultation without rales, rhonci, or wheezes.  CV:  Heart RRR without murmurs or S3 or S4  EXT:  Without cyanosis, clubbing, or edema.  NEURO:  Cranial nerves II through XII intact.  Motor function and sensation grossly intact.  SKIN: No rashes or suspicious lesions.  PSYCH:  Behavior is appropriate.      Assessment and Plan:     1. Hospital follow-up:   Hospitalization and results reviewed with patient. High risk conditions requiring teaching or care coordination were identified and addressed.The patient demonstrate understanding of admission and underlying conditions. The patient understands discharge instructions and when to seek medical attention. Medications reviewed including instructions regarding high risk medications, dosing and side effects.    The patient is able to safely adhere to ADL/IADL, treatment and medication regimen, self-manage of high-risk conditions? Yes  The patient requires physical therapy/home health/DME referral? Oxygen concentrator ordered.  The patient requires referral to care coordination/behavioral health/social work?  She is corresponding to nurse navigator.  Patient requires referral for pharmacy consult? No  Advance directive/POLST on file? No  Required counseling on advance directive?  No    2. Acute on chronic respiratory failure with hypoxia (CMS-HCC)  -Acute episode resolved    3. Metastatic adenocarcinoma (CMS-HCC)    - REFERRAL TO  HEMATOLOGY ONCOLOGY Referral to? [facility]    4. Chronic respiratory failure with hypoxia (CMS-HCC)  -Stable on 4L continuous oxygen.    5. Pleural effusion on left  -S/P thoracentesis    6. Pulmonary emphysema, unspecified emphysema type (CMS-HCC)  -Oxygen dependent    7. Chronic chest pain  -Discussed with her the contraindication of combining an opioid with xanax.    8. Essential hypertension  -Continue current treatment    9. Anxiety  - She is continuing xanax TID    10. Sleep apnea, unspecified type  -Continue CPAP    11. Paroxysmal atrial fibrillation (CMS-HCC)  -Continue cardiology follow up.        Medication Reconciliation  Medication list at end of encounter:   Current Outpatient Prescriptions   Medication Sig Dispense Refill   • DILTIAZem CD (CARDIZEM CD) 240 MG CAPSULE SR 24 HR Take 1 Cap by mouth 2 Times a Day. 180 Cap 0   • ALPRAZolam (XANAX) 0.5 MG Tab Take 1 Tab by mouth 3 times a day as needed for Anxiety for up to 30 days. 90 Tab 0   • cefdinir (OMNICEF) 300 MG Cap Take 1 Cap by mouth 2 times a day for 6 days. 12 Cap 0   • clotrimazole-betamethasone (LOTRISONE) 1-0.05 % Cream Apply 1 Application to affected area(s) 2 times a day. Apply to hands     • ondansetron (ZOFRAN ODT) 4 MG TABLET DISPERSIBLE Take 4 mg by mouth every four hours as needed for Nausea.     • cloNIDine (CATAPRES) 0.1 MG Tab TAKE 1 TAB BY MOUTH DAILY AS NEEDED WHEN SYSTOLIC BP >170 OR DIASTOLIC>99 30 Tab 3   • esomeprazole (NEXIUM) 40 MG delayed-release capsule Take 1 Cap by mouth every day. 90 Cap 3   • aspirin (ASA) 325 MG Tab Take 1 Tab by mouth every day. 100 Tab 2   • acetaminophen (TYLENOL) 500 MG Tab Take 1,000 mg by mouth every 8 hours as needed for Mild Pain.     • levalbuterol (XOPENEX HFA) 45 MCG/ACT inhaler Inhale 2 Puffs by mouth every four hours as needed for Shortness of Breath. 1 Inhaler 11     Current Facility-Administered Medications   Medication Dose Route Frequency Provider Last Rate Last Dose   •  ondansetron (ZOFRAN ODT) dispertab 4 mg  4 mg Oral Q8HRS PRN Mohan Vides M.D.           Primary care follow-up:    Recommended followup: To establish care with new Pcp    Future Appointments       Provider Department [place]    5/14/2018 3:30 PM Mague Calvin M.D. [facility] [address] [place] [address]    5/23/2018 1:40 PM Keyana Sierra M.D. [facility]     8/1/2018 4:20 PM Mohan Vides M.D. [facility]           Patient Instruction  Patient provided with educational material on discharge diagnosis and management of symptoms/red flags. Patient instructed to keep follow-up appointments and to bring written questions and and actual medications to each office visit. Patient instructed to call PCP/specialist with any problems/questions/concerns. Patient verbalizes understanding and has no further questions at this time.    Face-to-face transitional care management services with high medical decision complexity were provided.

## 2018-04-19 NOTE — TELEPHONE ENCOUNTER
Message   Received: Today   Message Contents   ERUM Branch L.P.N.   Caller: Unspecified (3 days ago,  9:39 AM)             I think 240 daily is okay. Sounds like she has lung cancer unfortunately. PETs are not available inpatient.

## 2018-04-20 NOTE — PROGRESS NOTES
"Received call from son, Yousif.  He reported that Bill suggested he call for additional information.  Yousif provided information that patient had given him.  Answered additional questions and provided general information.  He plans to come and be with patient as well as go to oncology appointment next week.  Yousif reports that patient has told him to get their business stuff in order.  He has seen her decline in health and was even asking questions regarding just \"making her comfortable\".  Did provide information on hospice care, home health and attendant care based on information he was requested.  He was very grateful for information to best help patient.  Emotional support also provided.  Gave direct number for additional questions and concerns.  "

## 2018-04-20 NOTE — TELEPHONE ENCOUNTER
I am not in the office today but I just called [facility] to clarify the order so Emily can get the continuous oxygen right away and not have to wait until I return on Tuesday. They transferred me to [facility] who they say is serving her area. I left a message on their voice mail asking them to call me ASAP on my cell phone ([phone]).

## 2018-04-20 NOTE — PROGRESS NOTES
Call placed to patient and spoke with Bill.  His main concern is diltiazem medication for patient.  They have not been able to get it refilled yet.  Navigator will reach back out to cardiology regarding request.  He also reports patient is in pain but can not take anything on top of her alprazalam r/t breathing concerns.  She is taking tylenol.  They still have not heard from oxygen company regarding order from Dr Zhu.  Navigator will also follow up on this.    Call placed to Dr Vides's office, left message for RN.  Call placed to [facility].  They report patient currently has a conserving devise with them.  Patient's previous concern was needed continuous oxygen in order to transport to appointments.  Call placed to Dr Zhu's office regarding request.  Did note that [facility] Will update physician regarding above.  Pt will need orders for portable oxygen tanks at continuous rate with additional needed information for them to change out current set up.

## 2018-04-20 NOTE — TELEPHONE ENCOUNTER
Tara Zamudio R.N.             MAURICE/Mary Beth     Patient's , Alex, wants a call back asa and can be reached at [phone]. His wife has been out Diltiazem for 4 days and [facility] has gotten no response from our office.      Tara Zamudio R.N.             MAURICE/Mary Beth Hanks, [facility] Navigator, wants a call back at [phone] asa about the patient's prescription for Diltiazem.        Called [facility] on [address] to check on Rx called in for diltiazem 240 BID on 4/16. Spoke with pharmacist and she will fill now. Called pt back and spoke with  to let him know. Verified Rx and location. He appreciates call and verbalizes understanding. Also spoke to Latonya RN Navigator to notify that diltiazem has been taken care of.

## 2018-04-20 NOTE — TELEPHONE ENCOUNTER
VOICEMAIL  1. Caller Name: [facility]                       Call Back Number: [phone] (home)     2. Message: lovy called stating the oxygent order sent to lisa, needs to get the increment of oxygent to 5 litters. Please resent.     3. Patient approves office to leave a detailed voicemail/MyChart message: yes

## 2018-04-21 NOTE — TELEPHONE ENCOUNTER
I am not back in the office until Tuesday. Will they take a verbal order until then? I tried to call [facility] directly but was transferred to voice mail. Who can I call to get this done? Thanks.

## 2018-04-23 NOTE — TELEPHONE ENCOUNTER
1. Caller Name: [facility]                                         Call Back Number: [phone]      Patient approves a detailed voicemail message: N\A    Kaylie Saucedo called today and would like to speak with you about this patients care. It was deemed necessary she seek hospice instead of home care. Please call back at your earliest convenience. Thank you!

## 2018-04-23 NOTE — TELEPHONE ENCOUNTER
Returned Gisselle's call. Left voice message instructing her to call me on my cell ([phone]).

## 2018-04-24 NOTE — TELEPHONE ENCOUNTER
Patient already has pulsating oxygen at 5 L/m which is inadequate. See MyChart correspondence with patient.

## 2018-04-24 NOTE — TELEPHONE ENCOUNTER
Patients son came in and wanted to know if he could please get  opinion on which hospice to go . He does understand that he can not favor any hospitals but should just like his personal preference as he is wise. The patients son also stated they were going through a specific oxygen company and [employer] oxygen has bought them out. Her primary care has currently relocated and they would like to know if she can be prescribed oxygen. The patients son stated she just wants to go but without pain. She is aware she isnt getting better but that she just wants to be comfortable. The son was also told by someone in [facility] that the patient will need to be ordered standing orders for her thoracentesis. The radiologist stated a 10 day cycle will be helpful. Please let me know f you would like me to do anything.

## 2018-04-24 NOTE — TELEPHONE ENCOUNTER
VOICEMAIL  1. Caller Name: Shruti @ [facility]                          Call Back Number: [phone] ext [phone]  (caller ID# [phone]).    2. Message: Calling regarding Dr Zhu's voicemail. Currently there is no DME company that can supply continuous oxygen at 5 LPM. Continuous oxygen only goes up to 3 LPM. However, they do have pulsating oxygen that goes up to 5 LPM but orders are needed to switch. Please call back @ [phone] asl [phone].

## 2018-04-24 NOTE — PROGRESS NOTES
"Patient given [facility] \"Preventing the Spread of Infection\" Brochure upon being checked in.     Dr. Valenzuela spoke with Dr. Higgins to clarify order.  Per Dr. Valenzuela only thoracentesis to be done at this time.    US guided left sided therapeutic thoracentesis done by Dr. Valenzuela; posterior left lateral chest wall access site; pre-procedure lung sounds assessment - RUL, MARIBEL, RML, LML, LLL, RLL are all clear, LLL is diminished and has crackels; post assessment lung sounds - RUL, MARIBEL, RML, LML, LLL, RLL are all clear and LLL is dimished; 1500 mL obtained and discarded; procedural RN: Gisselle; pt tolerated the procedure well; pt vitals remained stable pre/intra/post procedure; please reference scanned paper chart for all recorded vitals; patient provided with all appropriate education for procedure; all questions and concerns answered prior to d/c; pt d/c home.         "

## 2018-04-25 NOTE — TELEPHONE ENCOUNTER
VOICEMAIL  1. Caller Name: Yousif                      Call Back Number: [phone]    2. Message: Dr Zhu gave pt an abx for bad UTI. Pt feels like it is coming back. Wondering if they can have another abx before UTI gets bad again.     3. Dr Zhu, if hospice has taken over care, is this something they will do?

## 2018-04-25 NOTE — TELEPHONE ENCOUNTER
Patients  called and stated that at his wifes appointment yesterday that Dr. Higgins stated that if there was anything that he could do for them to give the office a call,  The patients  stated that the patient  has a bladder infection and does not have a primary care Dr.  He was wondering if  would be able to write a prescription for a antibiotic.

## 2018-04-25 NOTE — TELEPHONE ENCOUNTER
RN returned Mr. Lima's phone call.  He states the patient has had pain with urination for two days and is incontinent.  He states he's not sure if she has a fever as he is not sure where their thermometer is.  He states she is having chills and feeling hot and cold.  He states that [facility] RN is at the house now.  RN spoke with hospice RN Vijaya, she states she has assessed the patient and she is appropriate for hospice.  She will be calling Dr. Menjivar and they should have no problems getting the patient antibiotics.  She states she will call back if there are any issues.

## 2018-04-25 NOTE — PROGRESS NOTES
Consult Note: Oncology    Date of consultation: 4/24/2018 6:40 PM    Referring provider: Lawanda Zhu M.D.    Reason for consultation: Lung adenocarcinoma with malignant effusion-M1a    History of presenting illness:     Dear  Lawanda Zhu M.D.,    Thank you very much for allowing me to see  Emily Hopson today . As you know she  is a 82 y.o. year old female with a remote history of smoking and chronic lung nodules who was recently admitted to the hospital with malignant left-sided pleural effusion requiring thoracocentesis. She had gradually enlarging pulmonary nodules over the past 2 years or so. She refused any biopsies of this.CT of the chest done in January 2018 that also revealed lung nodules, described as a 2.3 cm spiculated mass superior segment left lower lobe concerning for malignancy, 5.6 mm nodule superior segment left lower lobe that may correspond to previously demonstrated nodule, 9.2 mm left lower lobe nodule that is new and also to right upper lobe nodules that are unchanged from previous imaging comparison. She had small bilateral pleural effusions noted at that time    , She had to undergo thoracocentesis with biopsy consistent with adenocarcinoma of lung origin.    . She has very poor performance status. She wanted to go to [place]. However, this was not possible due to worsening performance status. She presents to the clinic along with her family. She is wheelchair and oxygen dependent. She is hard of hearing. She reports having a lot of anxiety and generalized pain mainly in the chest/abdomen    Past Medical History:    Past Medical History:   Diagnosis Date   • Anemia    • Anesthesia     Doesn't breathe after general anesthesia   • Angina    • Atrial fibrillation (CMS-HCC)    • Bilateral cataracts         • Breath shortness    • Bronchitis    • CAD (coronary artery disease)    • CATARACT    • COPD    • COPD exacerbation (CMS-[facility]) 12/25/2013   • Dental disorder     Broken  teeth   • H/O vitamin D deficiency 2/16/2014   • Heart burn    • Hiatus hernia syndrome    • Hypercholesteremia    • Hypertension    • MI (myocardial infarction)     Due to angiospasm   • 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   • Personal history of venous thrombosis and embolism     Leg   • Pneumonia    • Psychiatric disorder     anxiety   • Seizure (CMS-HCC)     • Sleep apnea     Uses O2 at night and prn, also cpap   • Snoring    • Supplemental oxygen dependent     3-4 l in day, 6 l @ noc   • Ulcer of the stomach and intestine    • Unspecified disorder of thyroid     Thyroiditis   • Unspecified hemorrhagic conditions 1974    History of GI bleed   • Unstable angina (CMS-HCC) 2/16/2014       Past surgical history:    Past Surgical History:   Procedure Laterality Date   • GASTROSCOPY  7/23/2014    [facility]   • COLONOSCOPY  7/23/2014    Performed by Brett Estevez D.O. 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 30 Tab 0   • DILTIAZem CD (CARDIZEM CD) 240 MG CAPSULE SR 24 HR Take 1 Cap by mouth 2 Times a Day. 180 Cap 0   • ALPRAZolam (XANAX) 0.5 MG Tab Take 1 Tab by mouth 3 times a day as needed for Anxiety for up to 30 days. 90 Tab 0   • clotrimazole-betamethasone (LOTRISONE) 1-0.05 % Cream Apply 1 Application to affected area(s) 2 times a day. Apply to hands     • ondansetron (ZOFRAN ODT) 4 MG TABLET DISPERSIBLE Take 4 mg by mouth every four hours as needed for Nausea.     • acetaminophen (TYLENOL) 500 MG Tab Take 1,000 mg by mouth every 8 hours as needed for Mild Pain.     • cloNIDine (CATAPRES) 0.1 MG Tab TAKE 1 TAB BY MOUTH DAILY AS NEEDED WHEN SYSTOLIC BP >170 OR DIASTOLIC>99 30 Tab 3   • aspirin (ASA) 325 MG Tab Take 1 Tab by mouth every day. 100 Tab 2   • levalbuterol (XOPENEX HFA) 45 MCG/ACT inhaler Inhale 2 Puffs by mouth every four hours as needed for Shortness of Breath. 1 Inhaler 11   • esomeprazole (NEXIUM) 40 MG delayed-release capsule Take 1 Cap by mouth every day. 90 Cap 3     Current Facility-Administered Medications   Medication Dose Route Frequency Provider Last Rate Last Dose   • ondansetron (ZOFRAN ODT) dispertab 4 mg  4 mg Oral Q8HRS PRN Mohan Vides M.D.           Social History:     Social History     Social History   • Marital status:      Spouse name: N/A   • Number of children: N/A   • Years of education: N/A     Occupational History   • Not on file.     Social History Main Topics   • Smoking status: Former Smoker     Packs/day: 1.00     Years: 52.00     Types: Cigarettes     Start date: 1/17/1947     Quit date: 12/20/2013   • Smokeless tobacco: Never Used      Comment: 1 pk a day for 40 yrs   • Alcohol use No   • Drug use: No   • Sexual activity: Not Currently     Other Topics Concern   • Not on file     Social History Narrative   • No  "narrative on file       Family History:     Family History   Problem Relation Age of Onset   • Hypertension Mother    • Breast Cancer Mother    • Heart Disease Mother    • Dementia Father    • Other Son      Epilepsy       Review of Systems:  All other review of systems are negative except what was mentioned above in the HPI.    Constitutional: No fever, chills, positive for weight loss ,malaise/fatigue.    HEENT: No new auditory or visual complaints. No sore throat and neck pain.     Respiratory:No new cough, sputum production, positive for. Generalized his pain shortness of breath and wheezing.    Cardiovascular: No new chest pain, palpitations, orthopnea and leg swelling.    Gastrointestinal: No heartburn, nausea, vomiting ,abdominal pain, hematochezia or melena     Genitourinary: Negative for dysuria, hematuria    Musculoskeletal: No new arthralgias or myalgias   Skin: Negative for rash and itching.    Neurological: Negative for focal weakness or headaches.    Endo/Heme/Allergies: No abnormal bleed/bruise.    Psychiatric/Behavioral: No new depression/anxiety.    Physical Exam:  Vitals:   /68   Pulse (!) 101   Temp 36.4 °C (97.6 °F)   Resp 20   Ht 1.727 m (5' 7.99\")   SpO2 97%   Breastfeeding? No     General: Not in acute distress, alert and oriented x 3  HEENT: No pallor, icterus. Oropharynx clear.   Neck: Supple, no palpable masses.  Lymph nodes: No palpable cervical, supraclavicular, axillary or inguinal lymphadenopathy.    CVS: regular rate and rhythm, no rubs or gallops  RESP: Reduced air entry in the left base  ABD: Soft, non tender, non distended, positive bowel sounds, no palpable organomegaly  EXT: No edema or cyanosis  CNS: Alert and oriented x3, No focal deficits.  Skin- No rash      Labs:   No results for input(s): RBC, HEMOGLOBIN, HEMATOCRIT, PLATELETCT, PROTHROMBTM, APTT, INR, IRON, FERRITIN, TOTIRONBC in the last 72 hours.  Lab Results   Component Value Date/Time    SODIUM 139 " 04/11/2018 08:20 AM    POTASSIUM 4.4 04/11/2018 08:20 AM    CHLORIDE 101 04/11/2018 08:20 AM    CO2 32 04/11/2018 08:20 AM    GLUCOSE 108 (H) 04/11/2018 08:20 AM    BUN 15 04/11/2018 08:20 AM    CREATININE 0.70 04/11/2018 08:20 AM    CREATININE 1.0 04/28/2009 04:40 AM        Assessment and Plan:  Metastatic lung adenocarcinoma with malignant effusion M1a- ECOG PS=3-. She has numerous comorbidities and recently presented with a malignant effusion. She has significant symptoms and appears to be having reaccumulation of the pleural fluid on current exam.    Discussed the standard management options including obtaining more tissue for mutation testing, immunotherapy. However, given her very poor performance status, the patient and the family is not interested in pursuing any further workup or testing, which is very reasonable.    Discussed the goals of care was to be comfortable. She is again having pleural fluid reaccumulation. I did make arrangements for her to receive thoracocentesis for symptom relief today. Discussed the option of a Pleurx catheter, however, interventional radiology feel like we may have to hold off on it unless she started having reaccumulation more often.    She has severe anxiety and I have refilled her Xanax. Patient's family had various reasonable request, including hospital bed, oxygen supply.    I will make arrangements for her to be referred to home hospice as soon as possible so that the above can be arranged. She apparently has intolerance to the opioids. She will need anxiolytics and symptom control medication and I informed them that hospitalist team is well trained to deal with these.      . Hopefully, she will be comfortable with hospice care. Complex patient requiring complex decision making. I have extensively reviewed her prior medical records. Long discussion today about goals of care, need for hospice enrollment strategies to manage her symptoms and to arrange thoracocentesis.        Please note that this dictation was created using voice recognition software. I have made every reasonable attempt to correct obvious errors, but I expect that there are errors of grammar and possibly content that I did not discover before finalizing the note.      SIGNATURES:  Shaka Higgins    CC:  ERUM Chance Sharon, M.D.

## 2018-04-26 NOTE — PROGRESS NOTES
"Have been following chart since last touched base with patient to ensure she was able to receive needed follow up.  Pt is now enrolled in hospice.  Call placed to patient.  She intitially reported not feeling well.  Talked about having nausea and \"bloated abdomen\".  Encouraged her to reach out to hospice if current medication is not helping with symptoms.  Spent over 40 minutes with patient provided active listening and support.  She states is very grateful for everyone she has come into contact with since discharge from hospital.  She reports feels cared about and that she has made the right decision.  Pt talking about has had a good life and is \"not afraid to die\".  Pt states just does not want to suffer and understands that is goal for hospice as well.  Pt verbalized stressor from son, only related to having a lot of things to get taken care of in regards to their business.  She is very grateful for him and feels has support from him and Bill.  Cancer nurse navigator available if needed.  Patient no longer requiring cancer nurse navigation r/t on hospice now and being closely followed by them.    Pt was concerned about future appointments that are scheduled.  Call placed to hospice to verify what appointments patient will need to keep and what can be cancelled.  Left message.  "

## 2018-04-26 NOTE — TELEPHONE ENCOUNTER
That is correct. Hospice has their own physician directing treatment and they manage all medical care.

## 2018-04-27 NOTE — TELEPHONE ENCOUNTER
"New Oncology Patient 48 hour follow up:      Called to welcome patient to [facility] and to follow up on initial consult with Dr. Higgins on 4/24/2018. Patient is establish with Hospice and when I attempted to speak with her today she actually stated \"can I call you back, i'm actually with the hospice lady\". I will call patient again Monday if she does not return my call today.  "

## 2018-04-30 NOTE — LETTER
April 30, 2018        Emily Hopson  [address]  [place] [address]        Dear Emily:    The [facility] Care Coordination team of Registered Nurses, Social Workers, and Pharmacists who are partnered with your [facility] Providers are here to assist you with accessing resources and education to support your individual needs. As you work with your Care Coordination Team, you will be empowered to be successful with learning how to self-manage your health with the Patient Centered goals of helping you to feel better and staying out of the hospital. This program is at no cost to you as this is a part of [facility]’s ongoing commitment to serve the people of our community.    Benefits of working with your Care Coordination Team includes:  - Comprehensive assessment by a Registered Nurse on the telephone to identify your medical and health needs.   - Telephonic review of your medications by a Care Coordination Pharmacist to answer any questions you may have about your medications.  - Evaluation of social needs, such as, transportation; financial; food; housing; etc. by a Care Coordination  to connect you with eligible resources.  - For those eligible, we will connect you with the [facility] Program, offering access to services to assist you to manage your Congestive Heart Failure or Chronic Obstructive Pulmonary Disease in your own home.    Please contact me at [phone] to start on the road to your Care Coordination services.       I am available 5 days a week, Monday through Friday from 8:00 a.m. - 5:00 p.m.  I look forward to speaking with you soon.    Sincerely,       Your Care Coordination Team  Electronically Signed

## 2018-04-30 NOTE — TELEPHONE ENCOUNTER
Called patient regarding appointment which she had with Dr. Higgins on 4/24/2018. She stated that she has established with [facility] and they've been really helpful and taking great care of her. Patient will give us a call if she has further questions or concerns.

## 2018-04-30 NOTE — PROGRESS NOTES
Pt referred to  care coordination by [facility] team for additional community resources. Pt currently has [facility] in place and has met with  with hospice. Outreach call to pt and family to notify of care coordination and services in the event pt has additional needs. Currently [facility] in place to meet pt's and family's needs, no additional needs identified.     Plan:  · LSW will not actively follow pt at this time as [facility] is in place.   · Letter mailed to pt and family should additional services be needed in the future.

## 2018-05-01 NOTE — PROGRESS NOTES
Call placed to patient for follow up.  Pt currently sleeping, spoke with significant other Bill.  Will call back this afternoon to touch base.    Call placed to patient and spoke with Bill.  He asked patient if she was up for talking and she stated didn't feel good and asked to call back tomorrow.  Hospice if following patient.  Navigator available for additional support.  Will call back tomorrow per patient request.

## 2018-05-03 NOTE — PROGRESS NOTES
Emily Hopson was admitted to [facility] on 4/9/18 for a pleural effusion that led to the discovery of a metastatic adenocarcinoma. Patient was discharged on 4/12/18. [facility] patient advocate was able to successfully engage with the patient post-discharge and many times throughout the life cycle of the case. [facility] Patient Advocate escalated the case to [facility] on 4/13/18 secondary to patient complaining of extreme chest pain and not being able to breathe, despite being on pulsating oxygen at 5 liters. Patient had an appointment with Dr. Zhu on 4/18/18 and was ordered to have continuous oxygen at 5 liters. [facility] Patient Advocate made multiple calls to [facility], [facility] and to Dr. Zhu’s office to correct the order because continuous oxygen at 5 liters is not carried by DME providers in [place]. IHD Patient Advocate also reached out to Dr. Zhu’s office to change her referral from [facility] to [facility]. Patient was admitted to [facility] on 4/25/18 after seeing Dr. Higgins on 4/24/18. PPS screening was conducted at 50%.

## 2018-05-07 NOTE — ED TRIAGE NOTES
"  Chief Complaint   Patient presents with   • Shortness of Breath     BIB [facility] from comfort care facility for increasing SOB over past two days.  Patient has end stage lung cancer. Arrives dyspneic, SOB, on home CPAP refusing BIPAP by EMS. Patient has hx of fluid accumulation in abdomen with thoracentesis.  Patient arrives with black vomit around mouth.    BP (!) 179/96   Pulse (!) 109   Temp 36.9 °C (98.4 °F)   Resp (!) 32   Ht 1.651 m (5' 5\")   Wt 88.9 kg (196 lb)   SpO2 89%   BMI 32.62 kg/m²         "

## 2018-05-07 NOTE — ED NOTES
"Patient refusing IV.  Patient stating that \"only an MD can access this vein\"  Discussed with patient that we need to have IV in order to help her get comfortable.  Patient continues to refuse IV.  ERP aware.    "

## 2018-05-07 NOTE — TELEPHONE ENCOUNTER
Emerald from [facility] called stated that she had received a call from Elenita FITZGERALD, please call her on her cell [phone]

## 2018-05-07 NOTE — RESPIRATORY CARE
Adult Noninvasive Ventilation    BiPap Day 1     EPAP (cm H2O): 12 (05/07/18 1624)  FiO2: 45 (05/07/18 1624)            Events/Summary/Plan: Pt arrived with home unit on, home mask torn and does not fit connection, placed on CPAP with same home settings.  Awake and answering questions, able to reach mask with both hands.  (05/07/18 1624).

## 2018-05-07 NOTE — ED PROVIDER NOTES
ED Provider Note    CHIEF COMPLAINT  Chief Complaint   Patient presents with   • Shortness of Breath       HPI  Emily Hopson is a 82 y.o. female who presents requesting that we place a drain. They're referring to a pleural effusion. 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 She had a subsequent drainage a 2nd time. She has been on hospice but it is been challenging to keep her comfortable. She has had worsening breathing over several days, and was in fact set up for thoracentesis on Saturday. She canceled this because of logistical issues. They present here today hopeful that we will place an indwelling catheter. There has not been a fever at all. She has diffuse pain. Cannot localize it. History is somewhat limited as the patient is on BiPAP, apparently she is on Cpap through the day routinely. The patient is quite clear that she is not to be intubated or resuscitated. History is corroborated by her son and her significant other at the bedside. They clarify that we are to allow natural death to keep her comfortable. I reviewed the discharge summary from last month. I've also spoken with Dr. Menjivar who knows the patient quite well.    PAST MEDICAL HISTORY  Past Medical History:   Diagnosis Date   • Anemia    • Anesthesia     Doesn't breathe after general anesthesia   • Angina    • Atrial fibrillation ([facility])    • Bilateral cataracts         • Breath shortness    • Bronchitis    • CAD (coronary artery disease)    • CATARACT    • COPD    • COPD exacerbation ([facility]) 12/25/2013   • Dental disorder     Broken teeth   • H/O vitamin D deficiency 2/16/2014   • Heart burn    • Hiatus hernia syndrome    • Hypercholesteremia    • Hypertension    • MI (myocardial infarction) ([facility])     Due to angiospasm   • 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   • Personal history of venous thrombosis and embolism     Leg   • Pneumonia    • Psychiatric disorder     anxiety   • Seizure ([facility])     • Sleep apnea     Uses O2 at night and  prn, also cpap   • Snoring    • Supplemental oxygen dependent     3-4 l in day, 6 l @ noc   • Ulcer of the stomach and intestine    • Unspecified disorder of thyroid     Thyroiditis   • Unspecified hemorrhagic conditions 1974    History of GI bleed   • Unstable angina ([facility]) 2/16/2014       FAMILY HISTORY  Family History   Problem Relation Age of Onset   • Hypertension Mother    • Breast Cancer Mother    • Heart Disease Mother    • Dementia Father    • Other Son      Epilepsy       SOCIAL HISTORY  Social History   Substance Use Topics   • Smoking status: Former Smoker     Packs/day: 1.00     Years: 52.00     Types: Cigarettes     Start date: 1/17/1947     Quit date: 12/20/2013   • Smokeless tobacco: Never Used      Comment: 1 pk a day for 40 yrs   • Alcohol use No         SURGICAL HISTORY  Past Surgical History:   Procedure Laterality Date   • GASTROSCOPY  7/23/2014    Performed by Brett Estevez D.O. at [facility]   • COLONOSCOPY  7/23/2014    Performed by Brett Estevez D.O. 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    ALLERGIES  Allergies   Allergen Reactions   • Epinephrine Anaphylaxis     Per pt, allergic  "to epinephrine in local anesthetic    • Erythromycin Anaphylaxis     per patient   • Heparin Anaphylaxis     Per historical: thrombocytopenia.   • Meperidine Anaphylaxis   • Oxycodone Anaphylaxis   • Sulfa Drugs Anaphylaxis   • Talwin Anaphylaxis     per patient   • Warfarin Sodium Anaphylaxis     Due to also taking Nexium, pt's INR was 21.   • Codeine Rash and Vomiting     per patient super sensitive to medication, needs pediatric doses   • Fentanyl      super sensitive, needs pediatric doses per patient   • Milk Products Food Allergy Diarrhea and Nausea     Sensitivity to it. Causes bloating.    • Morphine      states she is supersensitive and needs a pediatric dose   • Novocain      With epinephrine, causes patient to go into A-Fib.   • Other Drug      States allergic to most local anesthetics with epi   • Tape Rash     patient states she needs paper tape used on her.    • Hydromorphone Hcl Unspecified     patient states she is super sensitive and needs pediatric doses.  This patient has been on this medication in the past for days while intubated per son.         REVIEW OF SYSTEMS  See HPI for further details. Review of systems as above, somewhat limited as the patient is on BiPAP. However augmented by family.    PHYSICAL EXAM  VITAL SIGNS: BP (!) 179/96   Pulse (!) 109   Temp 36.9 °C (98.4 °F)   Resp (!) 32   Ht 1.651 m (5' 5\")   Wt 88.9 kg (196 lb)   SpO2 93%   BMI 32.62 kg/m²   Constitutional: She appears chronically ill  HENT: Mucus membranes moist.  Oropharynx is clear.  Eyes: Pupils equally round.  No scleral icterus.   Neck: Full nontender range of motion.  Lymphatic: No cervical lymphadenopathy noted.   Cardiovascular: 100s, irregular.  No murmurs, rubs, nor gallop appreciated.   Thorax & Lungs: Chest is nontender.  Lungs are notable for diminished breath sounds bilaterally.  Abdomen: Soft, with no tenderness, rebound nor guarding.  No mass, pulsatile mass, nor hepatosplenomegaly " appreciated.  Skin: No purpura nor petechia noted.  Extremities/Musculoskeletal: No sign of trauma.  Calves are nontender with no cords nor edema.  No Dafne's sign.  Pulses are intact all around.   Neurologic: Alert & oriented.  Strength and sensation is intact all around.  Gait is normal.  Psychiatric: Normal affect appropriate for the clinical situation.    EKG  I interpreted this EKG myself.  This is a 12-lead study.  The rhythm is atrial fibrillation with a rate of 112.  There are no ST segment nor T wave abnormalities.  Interpretation: No ST segment elevation myocardial infarction.      RADIOLOGY/PROCEDURES  I have reviewed the patient's film interpretations myself, and they are read out by the radiologist as:   DX-CHEST-PORTABLE (1 VIEW)   Final Result      Diffuse opacification left hemithorax likely due to pleural fluid versus collapse.      US-THORACENTESIS PUNCTURE LEFT    (Results Pending)     .    MEDICAL RECORD  I have reviewed patient's medical record and pertinent results are listed above.    COURSE & MEDICAL DECISION MAKING  I have reviewed any medical record information, laboratory studies and radiographic results as noted above.  Patient denies a worsening respiratory difficulty over several days. Presumption by patient and family is a recurrence of malignant effusion. Son is very upset that an indwelling pleurex catheter wasn't placed, however from my understanding, it was not at all clear initially whether she would need recurrent drainage and therefore the recommendation was to wait and see 1st. I discussed this with the son who I think understands that. As I am awaiting for the workup to proceed I have spoken with Dr. Harman from interventional. It sounds that they can perform a thoracentesis today, but a tunnel catheter will not be done today. Will admit her to the hospital and have it done tomorrow. I advised the son of this. Also it is quite clear that we are not intubate her or proceed with  "aggressive interventions and we are to allow natural death.     1707: I ordered an IV and laboratories is not at all clear that her dyspnea is from an effusion at this point. Further, I would anticipate an IV to be used to make her comfortable and to facilitate her evaluation at interventional. The patient is refusing this. I talked to her about it, she refuses. Her  says \"all she needs a thoracentesis.\" Her son as well does not think that she needs an IV or any kind of workup because she is \"terminal.\"    1800: Chest X ray returned showing white out of the left lung, presumably a recurrence of that effusion. The plan has changed again, they would like to go home after the thoracentesis and follow-up with indwelling catheter placement on a scheduled basis. I have called and talked with Dr. Menjivar again about this.    The patient will be discharged after her procedure per radiology. Instructions on thoracentesis after care.    The plan has changed yet again, apparently the son was expecting another x-ray, and nursing put that in as an order for me. The patient feels better and would like to go home. However that x-ray was obtained. Discussed results with Dr. Campbell, she still that effusion but looks significantly improved. As noted previously they will be arranging placement of a pleural catheter on an outpatient basis.      FINAL IMPRESSION  1. Respiratory distress    2. Pleural effusion           This dictation was created using voice recognition software.    Electronically signed by: Mele Rodríguez, 5/7/2018 4:51 PM    "

## 2018-05-07 NOTE — TELEPHONE ENCOUNTER
Patient son called stated that he would like for his mom chest to be drained ASAP. But his moms on [facility]osp[facility] care (Kaylie Mishra is her Nurse ) he also keeps being told by [facility]  that there is not enough fluid. He would like a call back to see if we could help . Thank you

## 2018-05-08 NOTE — PROGRESS NOTES
Went to [facility] and had Dr Harman consent the patient.  He performed the left thoracentesis.  Vitals were monitored by ER nurse.  Removed 1600 ml of fluid and sent 0 ml to lab.  Patient tolerated procedure well and was in stable condition when I left.

## 2018-05-08 NOTE — DISCHARGE INSTRUCTIONS
Thoracentesis, Care After  Refer to this sheet in the next few weeks. These instructions provide you with information about caring for yourself after your procedure. Your health care provider may also give you more specific instructions. Your treatment has been planned according to current medical practices, but problems sometimes occur. Call your health care provider if you have any problems or questions after your procedure.  WHAT TO EXPECT AFTER THE PROCEDURE  After your procedure, it is common to have pain at the puncture site.  HOME CARE INSTRUCTIONS  · Take medicines only as directed by your health care provider.  · You may return to your normal diet and normal activities as directed by your health care provider.  · Drink enough fluid to keep your urine clear or pale yellow.  · Do not take baths, swim, or use a hot tub until your health care provider approves.  · Follow your health care provider's instructions about:  ¨ Puncture site care.  ¨ Bandage (dressing) changes and removal.  · Check your puncture site every day for signs of infection. Watch for:  ¨ Redness, swelling, or pain.  ¨ Fluid, blood, or pus.  · Keep all follow-up visits as directed by your health care provider. This is important.  SEEK MEDICAL CARE IF:  · You have redness, swelling, or pain at your puncture site.  · You have fluid, blood, or pus coming from your puncture site.  · You have a fever.  · You have chills.  · You have nausea or vomiting.  · You have trouble breathing.  · You develop a worsening cough.  SEEK IMMEDIATE MEDICAL CARE IF:  · You have extreme shortness of breath.  · You develop chest pain.  · You faint or feel light-headed.     This information is not intended to replace advice given to you by your health care provider. Make sure you discuss any questions you have with your health care provider.     Document Released: 01/08/2016 Document Reviewed: 01/08/2016  [employer] Interactive Patient Education ©2016 [employer]

## 2018-05-08 NOTE — ED NOTES
"PT IS UP FOR D/C,  RN WENT OT D/C PT.  AS PER PTS  \" SHE IS TO GET A SECOND XRAY\".  ER MD WAS TOLD PT AND FAMILY WANTS TO  TALK TO HIM.   "

## 2018-05-08 NOTE — ED NOTES
AS PER ER MD PT CAN BE D/C. PT IS AAOX4, PT IS IN NAD. PT IS BEING D/C, PT IS LEAVENING WITH FAMILY ON O2. PT AND FAMILY  WERE GIVEN D/C INSTRUCTIONS AND THEY STATED UNDERSTANDING. PT WAS TAKEN TO0 HER CAR IN W/C ON O2. SHE WAS THEN PLACED ON HER O2 IN HER CAR.

## 2018-05-08 NOTE — TELEPHONE ENCOUNTER
Return son's phone call, Yousif. he stated that his mother has been suffering for the last few days due to unable to breathe and he is certain that his mother has fluid in his lungs again. Patient is currently on hospice and they were able to arrange thoracentesis on this previous Saturday but unfortunately patient unable to make it. I spoke with the son and he stated he is feeling frustrated that they were unable to place a catheter drain for patient at home while on hospice. I did explain to the son that recommendation from the interventional radiologist is for patient to require more frequent thoracentesis before placing a catheter. Son very frustrated that his mother is suffering at this time. He did state that it is difficult to get the patient back and forth to the hospital for thoracentesis procedures. At the time of my discussion with the son the patient was being taken directly to the emergency department via [facility] due to significant shortness of breath. I was able to speak with hospice case manager Charleen as well as the manager of the hospice team Jacey today as well.

## 2018-05-08 NOTE — ED NOTES
The Medication Reconciliation process has been PARTIALLY completed by interviewing the patient's family who states that the patient is on hospice now, unsure of the meds.  I placed a call to [facility] to obtain the current meds.     Allergies have been reviewed  Antibiotic use in 30 days - none    Home Pharmacy:  [facility]

## 2018-05-08 NOTE — ED NOTES
Assist IR in room with thoracentesis.  Patient tolerating well.  Consent signed prior to procedure start with time out performed pt procedure.  VSS at this time.

## 2018-05-08 NOTE — ED NOTES
Talked with hospice nurse and confirmed dosages on the lorazepam and morphine, she also reports that the other meds on the profile are available to the patient at home but cannot confirm if the patient has been able to tolerate any doses recently.

## 2018-05-08 NOTE — OR SURGEON
Immediate Post- Operative Note        PostOp Diagnosis: L effusion.       Procedure(s): L US guided thoracentesis.       Estimated Blood Loss: Less than 5 ml        Complications: None            5/7/2018     6:47 PM     Wei Harman

## 2018-05-12 VITALS — RESPIRATION RATE: 40 BRPM | HEART RATE: 64 BPM

## 2018-05-12 VITALS — HEART RATE: 120 BPM | RESPIRATION RATE: 44 BRPM

## 2018-05-12 SDOH — ECONOMIC STABILITY: HOUSING INSECURITY: UNSAFE COOKING RANGE AREA: 0

## 2018-05-12 SDOH — ECONOMIC STABILITY: GENERAL

## 2018-05-12 SDOH — ECONOMIC STABILITY: HOUSING INSECURITY: UNSAFE APPLIANCES: 0

## 2018-05-12 ASSESSMENT — ENCOUNTER SYMPTOMS
BOWEL INCONTINENCE: 1
SHORTNESS OF BREATH: 1
DYSPNEA ON EXERTION: 1
DYSPNEA ACTIVITY LEVEL: AT REST
FATIGUES EASILY: 1

## 2018-05-12 ASSESSMENT — ACTIVITIES OF DAILY LIVING (ADL): MONEY MANAGEMENT (EXPENSES/BILLS): TOTALLY DEPENDENT

## 2018-05-14 ENCOUNTER — APPOINTMENT (OUTPATIENT)
Dept: BEHAVIORAL HEALTH | Facility: PHYSICIAN GROUP | Age: 83
End: 2018-05-14
Payer: MEDICARE

## 2018-05-16 ENCOUNTER — APPOINTMENT (OUTPATIENT)
Dept: RADIOLOGY | Facility: MEDICAL CENTER | Age: 83
End: 2018-05-16
Attending: INTERNAL MEDICINE
Payer: MEDICARE

## 2020-03-13 NOTE — DISCHARGE PLANNING
Received a call from Gisselle that the Aspira Cath would not be inserted today. Per Gisselle in Imaging, that patient states that [facility] was supposed to deliver O2 tanks to the patients home but were now refusing. Called [facility] spoke with Trevor Reilly, and dispatch, back to Trevor then back to dispatch where I had to leave a VM.. Apparently, [facility] had attempted to deliver 9 O2 tanks this AM and no one was there to accept them. Called Gisselle, back to update her and she advised me that the son had gotten a hold of [facility] and they would be delivering the tanks.   [As Noted in HPI] : as noted in HPI [Lower Ext Edema] : lower extremity edema [Limb Swelling] : limb swelling [Negative] : Endocrine [Fever] : no fever [Feeling Poorly] : not feeling poorly [Eye Pain] : no eye pain [Earache] : no earache [Shortness Of Breath] : no shortness of breath [Abdominal Pain] : no abdominal pain [Dysuria] : no dysuria [Dizziness] : no dizziness [Easy Bleeding] : no tendency for easy bleeding [FreeTextEntry5] : mild bilateral LE edema

## 2021-04-28 NOTE — PROGRESS NOTES
Call placed to patient.  Alex answered phone and reported patient had taken some medication and was taking a nap.  Navigator reported was just following up on patient per request.  Encouraged patient to call if she would like to touch base with navigator.  Hospice assisting patient and patient has completed active cancer navigation.  
28-Apr-2021 19:34

## 2022-02-02 NOTE — PROGRESS NOTES
5/10/17  -    Outcome: Left Message    WebIZ Checked & Epic Updated:  yes    [facility] Verified: no (MCR)    Attempt # 1       Orders placed for CTA C/A/P w/ for surveillance purposes for Dr. Demetra Johnson. BUN/creatinine ordered stat to be completed prior to scan. Will give to Anastasiia Hughes, for scheduling purposes. Addendum: orders changed to hospital performed per new policy.